# Patient Record
Sex: MALE | Race: AMERICAN INDIAN OR ALASKA NATIVE
[De-identification: names, ages, dates, MRNs, and addresses within clinical notes are randomized per-mention and may not be internally consistent; named-entity substitution may affect disease eponyms.]

---

## 2020-04-18 ENCOUNTER — HOSPITAL ENCOUNTER (INPATIENT)
Dept: HOSPITAL 5 - ED | Age: 60
LOS: 2 days | Discharge: HOME | DRG: 287 | End: 2020-04-20
Attending: INTERNAL MEDICINE | Admitting: INTERNAL MEDICINE
Payer: COMMERCIAL

## 2020-04-18 DIAGNOSIS — K21.9: ICD-10-CM

## 2020-04-18 DIAGNOSIS — Z79.899: ICD-10-CM

## 2020-04-18 DIAGNOSIS — F17.210: ICD-10-CM

## 2020-04-18 DIAGNOSIS — I25.10: Primary | ICD-10-CM

## 2020-04-18 DIAGNOSIS — E11.9: ICD-10-CM

## 2020-04-18 DIAGNOSIS — I10: ICD-10-CM

## 2020-04-18 DIAGNOSIS — B44.89: ICD-10-CM

## 2020-04-18 DIAGNOSIS — Z71.6: ICD-10-CM

## 2020-04-18 DIAGNOSIS — E78.1: ICD-10-CM

## 2020-04-18 DIAGNOSIS — Z72.89: ICD-10-CM

## 2020-04-18 LAB
ALBUMIN SERPL-MCNC: 3.8 G/DL (ref 3.9–5)
ALBUMIN SERPL-MCNC: 4.2 G/DL (ref 3.9–5)
ALT SERPL-CCNC: 44 UNITS/L (ref 7–56)
ALT SERPL-CCNC: 47 UNITS/L (ref 7–56)
BACTERIA #/AREA URNS HPF: (no result) /HPF
BASOPHILS # (AUTO): 0.1 K/MM3 (ref 0–0.1)
BASOPHILS # (AUTO): 0.1 K/MM3 (ref 0–0.1)
BASOPHILS NFR BLD AUTO: 1.4 % (ref 0–1.8)
BASOPHILS NFR BLD AUTO: 1.7 % (ref 0–1.8)
BENZODIAZEPINES SCREEN,URINE: (no result)
BILIRUB UR QL STRIP: (no result)
BLOOD UR QL VISUAL: (no result)
BUN SERPL-MCNC: 13 MG/DL (ref 9–20)
BUN SERPL-MCNC: 13 MG/DL (ref 9–20)
BUN/CREAT SERPL: 16 %
BUN/CREAT SERPL: 16 %
CALCIUM SERPL-MCNC: 9.1 MG/DL (ref 8.4–10.2)
CALCIUM SERPL-MCNC: 9.1 MG/DL (ref 8.4–10.2)
EOSINOPHIL # BLD AUTO: 0.1 K/MM3 (ref 0–0.4)
EOSINOPHIL # BLD AUTO: 0.1 K/MM3 (ref 0–0.4)
EOSINOPHIL NFR BLD AUTO: 1 % (ref 0–4.3)
EOSINOPHIL NFR BLD AUTO: 1.8 % (ref 0–4.3)
HCT VFR BLD CALC: 38.9 % (ref 35.5–45.6)
HCT VFR BLD CALC: 39.4 % (ref 35.5–45.6)
HEMOLYSIS INDEX: 3
HEMOLYSIS INDEX: 7
HGB BLD-MCNC: 12.8 GM/DL (ref 11.8–15.2)
HGB BLD-MCNC: 13 GM/DL (ref 11.8–15.2)
LYMPHOCYTES # BLD AUTO: 2.1 K/MM3 (ref 1.2–5.4)
LYMPHOCYTES # BLD AUTO: 2.5 K/MM3 (ref 1.2–5.4)
LYMPHOCYTES NFR BLD AUTO: 33.2 % (ref 13.4–35)
LYMPHOCYTES NFR BLD AUTO: 35.5 % (ref 13.4–35)
MCHC RBC AUTO-ENTMCNC: 33 % (ref 32–34)
MCHC RBC AUTO-ENTMCNC: 33 % (ref 32–34)
MCV RBC AUTO: 87 FL (ref 84–94)
MCV RBC AUTO: 87 FL (ref 84–94)
METHADONE SCREEN,URINE: (no result)
MONOCYTES # (AUTO): 0.6 K/MM3 (ref 0–0.8)
MONOCYTES # (AUTO): 0.6 K/MM3 (ref 0–0.8)
MONOCYTES % (AUTO): 8 % (ref 0–7.3)
MONOCYTES % (AUTO): 9.8 % (ref 0–7.3)
MUCOUS THREADS #/AREA URNS HPF: (no result) /HPF
OPIATE SCREEN,URINE: (no result)
PH UR STRIP: 5 [PH] (ref 5–7)
PLATELET # BLD: 381 K/MM3 (ref 140–440)
PLATELET # BLD: 416 K/MM3 (ref 140–440)
PROT UR STRIP-MCNC: (no result) MG/DL
RBC # BLD AUTO: 4.49 M/MM3 (ref 3.65–5.03)
RBC # BLD AUTO: 4.54 M/MM3 (ref 3.65–5.03)
RBC #/AREA URNS HPF: 1 /HPF (ref 0–6)
UROBILINOGEN UR-MCNC: < 2 MG/DL (ref ?–2)
WBC #/AREA URNS HPF: < 1 /HPF (ref 0–6)

## 2020-04-18 PROCEDURE — 82962 GLUCOSE BLOOD TEST: CPT

## 2020-04-18 PROCEDURE — 99406 BEHAV CHNG SMOKING 3-10 MIN: CPT

## 2020-04-18 PROCEDURE — 71045 X-RAY EXAM CHEST 1 VIEW: CPT

## 2020-04-18 PROCEDURE — C1894 INTRO/SHEATH, NON-LASER: HCPCS

## 2020-04-18 PROCEDURE — 93017 CV STRESS TEST TRACING ONLY: CPT

## 2020-04-18 PROCEDURE — 36415 COLL VENOUS BLD VENIPUNCTURE: CPT

## 2020-04-18 PROCEDURE — 83036 HEMOGLOBIN GLYCOSYLATED A1C: CPT

## 2020-04-18 PROCEDURE — A9502 TC99M TETROFOSMIN: HCPCS

## 2020-04-18 PROCEDURE — 80307 DRUG TEST PRSMV CHEM ANLYZR: CPT

## 2020-04-18 PROCEDURE — 85730 THROMBOPLASTIN TIME PARTIAL: CPT

## 2020-04-18 PROCEDURE — 80061 LIPID PANEL: CPT

## 2020-04-18 PROCEDURE — 78452 HT MUSCLE IMAGE SPECT MULT: CPT

## 2020-04-18 PROCEDURE — 85610 PROTHROMBIN TIME: CPT

## 2020-04-18 PROCEDURE — 93010 ELECTROCARDIOGRAM REPORT: CPT

## 2020-04-18 PROCEDURE — 93306 TTE W/DOPPLER COMPLETE: CPT

## 2020-04-18 PROCEDURE — 80048 BASIC METABOLIC PNL TOTAL CA: CPT

## 2020-04-18 PROCEDURE — 93005 ELECTROCARDIOGRAM TRACING: CPT

## 2020-04-18 PROCEDURE — 85025 COMPLETE CBC W/AUTO DIFF WBC: CPT

## 2020-04-18 PROCEDURE — 84484 ASSAY OF TROPONIN QUANT: CPT

## 2020-04-18 PROCEDURE — 83735 ASSAY OF MAGNESIUM: CPT

## 2020-04-18 PROCEDURE — 93458 L HRT ARTERY/VENTRICLE ANGIO: CPT

## 2020-04-18 PROCEDURE — 80053 COMPREHEN METABOLIC PANEL: CPT

## 2020-04-18 PROCEDURE — 81001 URINALYSIS AUTO W/SCOPE: CPT

## 2020-04-18 PROCEDURE — C1769 GUIDE WIRE: HCPCS

## 2020-04-18 RX ADMIN — LISINOPRIL SCH MG: 5 TABLET ORAL at 12:19

## 2020-04-18 RX ADMIN — MULTIVITAMIN TABLET SCH EACH: TABLET at 12:19

## 2020-04-18 RX ADMIN — MORPHINE SULFATE PRN MG: 4 INJECTION, SOLUTION INTRAMUSCULAR; INTRAVENOUS at 18:39

## 2020-04-18 RX ADMIN — PANTOPRAZOLE SODIUM SCH MG: 40 TABLET, DELAYED RELEASE ORAL at 12:19

## 2020-04-18 RX ADMIN — MORPHINE SULFATE PRN MG: 4 INJECTION, SOLUTION INTRAMUSCULAR; INTRAVENOUS at 22:24

## 2020-04-18 RX ADMIN — MORPHINE SULFATE PRN MG: 4 INJECTION, SOLUTION INTRAMUSCULAR; INTRAVENOUS at 06:05

## 2020-04-18 RX ADMIN — Medication SCH ML: at 22:25

## 2020-04-18 RX ADMIN — METOPROLOL TARTRATE SCH MG: 50 TABLET, FILM COATED ORAL at 22:24

## 2020-04-18 RX ADMIN — Medication SCH ML: at 12:20

## 2020-04-18 RX ADMIN — MORPHINE SULFATE PRN MG: 4 INJECTION, SOLUTION INTRAMUSCULAR; INTRAVENOUS at 16:32

## 2020-04-18 RX ADMIN — MORPHINE SULFATE PRN MG: 4 INJECTION, SOLUTION INTRAMUSCULAR; INTRAVENOUS at 12:19

## 2020-04-18 NOTE — EMERGENCY DEPARTMENT REPORT
ED Chest Pain HPI





- General


Chief Complaint: Dyspnea/Respdistress


Stated Complaint: CHEST PAIN


Time Seen by Provider: 04/18/20 03:34


Source: patient, EMS


Mode of arrival: Ambulatory


Limitations: No Limitations





- History of Present Illness


Initial Comments: 





Patient is a 59-year-old male that presents emergency room with complaints of 

chest pain and shortness of breath that started at 2 AM.  Patient states his 

chest.  Is better with rest and worse with movement and exertion.  Patient 

states his shortness of breath is better with rest and worse with exertion.  

Patient states that his chest pain is better with rest.  Patient states his 

shortness of breath is better with rest.  Patient states he smokes.  Patient 

states he drank 2 beers today.  He states he has a past medical history of 

hypertension and diabetes.  Patient states the chest pain is radiating to his 

back.


MD Complaint: chest pain


-: Sudden


Onset: during rest


Pain Location: left chest


Pain Radiation: back


Severity: severe


Severity scale (0 -10): 8


Quality: sharp


Consistency: constant


Improves With: rest


Worsens With: exertion, movement


re: dyspnea.  denies: nausea, vomting, diaphoresis, sense of impending doom


Other Symptoms: denies: cough, fever, syncope, rash, acid taste in mouth, leg 

swelling, palpitations, burping


Treatments Prior to Arrival: none


Aspirin use within the Past 7 Days: (1) Yes





- Related Data


On Oral Contraceptives: No


                                Home Medications











 Medication  Instructions  Recorded  Confirmed  Last Taken


 


lisinopriL [Zestril TAB] 5 mg PO QDAY 08/23/19 04/18/20 08/22/19








                                  Previous Rx's











 Medication  Instructions  Recorded  Last Taken  Type


 


Esomeprazole Magnesium [Nexium 20 mg PO DAILY #30 tab 12/21/19 Unknown Rx





24Hr]    


 


Metoclopramide [Reglan TAB] 10 mg PO QID PRN #30 tab 12/21/19 Unknown Rx


 


Multivitamin with Folic Acid [[facility] 400 mcg PO QDAY #30 tablet 12/21/19 Unknown Rx





One Daily Essential Tablet]    


 


chlordiazePOXIDE [Librium] 25 mg PO Q6H PRN #25 capsule 12/21/19 Unknown Rx


 


Cyclobenzaprine [Flexeril] 10 mg PO TID PRN #30 tablet 01/09/20 Unknown Rx


 


HYDROcodone/APAP 5-325 [[facility] 1 each PO Q6HR PRN #10 tablet 03/04/20 Unknown Rx





5/325]    











                                    Allergies











Allergy/AdvReac Type Severity Reaction Status Date / Time


 


No Known Allergies Allergy   Verified 12/20/19 15:20














Heart Score





- HEART Score


History: Slightly suspicious


EKG: Non-specific


Age: 45-65


Risk factors: 1-2 risk factors


Troponin: < normal limit


HEART Score: 3





ED Review of Systems


ROS: 


Stated complaint: CHEST PAIN


Other details as noted in HPI





Constitutional: denies: chills, fever


Eyes: denies: eye pain, eye discharge, vision change


ENT: denies: ear pain, throat pain


Respiratory: shortness of breath.  denies: cough, wheezing


Cardiovascular: chest pain.  denies: palpitations


Endocrine: no symptoms reported


Gastrointestinal: denies: abdominal pain, nausea, diarrhea


Genitourinary: denies: urgency, dysuria


Musculoskeletal: denies: back pain, joint swelling, arthralgia


Skin: denies: rash, lesions


Neurological: denies: headache, weakness, paresthesias


Psychiatric: denies: anxiety, depression


Hematological/Lymphatic: denies: easy bleeding, easy bruising





ED Past Medical Hx





- Past Medical History


Previous Medical History?: Yes


Hx Hypertension: Yes


Hx Heart Attack/AMI: No


Hx Congestive Heart Failure: No


Hx Diabetes: Yes


Hx Deep Vein Thrombosis: No


Hx GERD: Yes


Hx Liver Disease: No


Hx Asthma: No


Hx COPD: No


Additional medical history: ETOH





- Surgical History


Past Surgical History?: Yes


Hx Coronary Stent: No


Hx Pacemaker: No


Hx Internal Defibrillator: No


Additional Surgical History: right arm surgery- to repair artery





- Family History


Family history: no significant





- Social History


Smoking Status: Current Every Day Smoker


Substance Use Type: Alcohol





- Medications


Home Medications: 


                                Home Medications











 Medication  Instructions  Recorded  Confirmed  Last Taken  Type


 


lisinopriL [Zestril TAB] 5 mg PO QDAY 08/23/19 04/18/20 08/22/19 History


 


Esomeprazole Magnesium [Nexium 20 mg PO DAILY #30 tab 12/21/19 04/18/20 Unknown 

Rx





24Hr]     


 


Metoclopramide [Reglan TAB] 10 mg PO QID PRN #30 tab 12/21/19 04/18/20 Unknown 

Rx


 


Multivitamin with Folic Acid [[facility] 400 mcg PO QDAY #30 tablet 12/21/19 04/18/20 

Unknown Rx





One Daily Essential Tablet]     


 


chlordiazePOXIDE [Librium] 25 mg PO Q6H PRN #25 capsule 12/21/19 04/18/20 Unknow

n Rx


 


Cyclobenzaprine [Flexeril] 10 mg PO TID PRN #30 tablet 01/09/20 04/18/20 Unknown

 Rx


 


HYDROcodone/APAP 5-325 [[facility] 1 each PO Q6HR PRN #10 tablet 03/04/20 04/18/20 

Unknown Rx





5/325]     














ED Physical Exam





- General


Limitations: No Limitations


General appearance: alert, in no apparent distress





- Head


Head exam: Present: atraumatic, normocephalic





- Eye


Eye exam: Present: normal appearance





- ENT


ENT exam: Present: mucous membranes moist





- Neck


Neck exam: Present: normal inspection





- Respiratory


Respiratory exam: Present: normal lung sounds bilaterally.  Absent: respiratory 

distress





- Cardiovascular


Cardiovascular Exam: Present: regular rate, normal rhythm.  Absent: systolic 

murmur, diastolic murmur, rubs, gallop





- GI/Abdominal


GI/Abdominal exam: Present: soft, normal bowel sounds





- Rectal


Rectal exam: Present: deferred





- Extremities Exam


Extremities exam: Present: normal inspection





- Back Exam


Back exam: Present: normal inspection





- Neurological Exam


Neurological exam: Present: alert, oriented X3





- Psychiatric


Psychiatric exam: Present: normal affect, normal mood





- Skin


Skin exam: Present: warm, dry, intact, normal color.  Absent: rash





ED Course


                                   Vital Signs











  04/18/20 04/18/20 04/18/20





  03:30 03:54 04:15


 


Temperature 98.8 F  


 


Pulse Rate 98 H  90


 


Respiratory 22 19 18





Rate   


 


Blood Pressure   138/79


 


Blood Pressure 152/86  





[Left]   


 


O2 Sat by Pulse 100  94





Oximetry   














  04/18/20 04/18/20 04/18/20





  04:30 04:45 05:00


 


Temperature   


 


Pulse Rate 92 H 92 H 90


 


Respiratory 19 17 19





Rate   


 


Blood Pressure 124/74 118/71 118/69


 


Blood Pressure   





[Left]   


 


O2 Sat by Pulse 93 94 95





Oximetry   














  04/18/20 04/18/20 04/18/20





  05:15 05:30 06:00


 


Temperature   


 


Pulse Rate 92 H 94 H 99 H


 


Respiratory 18 26 H 17





Rate   


 


Blood Pressure 113/66 125/73 130/90


 


Blood Pressure   





[Left]   


 


O2 Sat by Pulse 95 95 99





Oximetry   














  04/18/20 04/18/20





  06:05 06:15


 


Temperature  


 


Pulse Rate  87


 


Respiratory 19 28 H





Rate  


 


Blood Pressure  120/76


 


Blood Pressure  





[Left]  


 


O2 Sat by Pulse  97





Oximetry  














- Reevaluation(s)


Reevaluation #1: 


I discussed all results with patient.  I discussed plan of care with patient.  

Patient agrees with plan of care and admission.  Patient to be admitted to the 

hospitalist service.


04/18/20 04:48








- Consultations


Consultation #1: 


Hospitalist consulted for admission.  Hospitalist to admit patient.  Bridge 

orders placed.


04/18/20 04:48








ERUM score





- Erum Score


Age > 65: (1) Yes


Aspirin use within the Past 7 Days: (1) Yes


3 or more CAD Risk Factors: (0) No


2 or more Angina events in past 24 hrs: (0) No


Known CAD with more than 50% Stenosis: (0) No


Elevated Cardiac Markers: (0) No


ST Deviation Greater than 0.5mm: (0) No


ERUM Score: 2





ED Medical Decision Making





- Lab Data


Result diagrams: 


                                 04/18/20 03:51





                                 04/18/20 03:51





- EKG Data


-: EKG Interpreted by Me


EKG shows normal: sinus rhythm, axis, intervals, QRS complexes, ST-T waves


Rate: normal





- Radiology Data


Radiology results: report reviewed, image reviewed








 CHEST 1 VIEW 





INDICATION / CLINICAL INFORMATION: 


Dyspnea. 





COMPARISON: 


Prior chest radiograph 12/20/2019 and prior chest CT, 8/1/2019 





FINDINGS: 





SUPPORT DEVICES: None. 





HEART / MEDIASTINUM: No significant abnormality. 





LUNGS / PLEURA: There continues to be pleural-parenchymal disease within the 

left upper lobe. The 


 appearance is very suggestive for a cavitary lesion containing soft tissue 

mass. Review of chest CT 


 on 8/1/2019 is suggestive of aspergilloma. The appearance is unchanged. The 

remainder of both lungs 


 are clear.. 





ADDITIONAL FINDINGS: No significant additional findings. 





IMPRESSION: 


1. Suspect aspergilloma within the left lung apex. This finding is unchanged 

from prior exam. 


2. No new findings. 








- Medical Decision Making





Patient is a 59-year-old male that presents emergency room with complaints of 

chest pain.  Patient chest pain rating to the back.  Patient heart score is 

above 2 and patient admitted to the hospital service.  Patient also complained 

of shortness of breath and dyspnea on exertion.  Patient had a chest x-ray and 

it shows a right upper lobe aspergillosis.  Patient require a rule out ACS by 

the hospitalist service.  Patient's EKG is unremarkable.  Patient's labs 

essentially unremarkable.











- Differential Diagnosis


Chest pain, shortness of breath, ACS.


Critical Care Time: Yes


Critical care time in (mins) excluding proc time.: 35


Critical care attestation.: 


If time is entered above; I have spent that time in minutes in the direct care 

of this critically ill patient, excluding procedure time.





Critical Care Time: 





35 minutes





ED Disposition


Clinical Impression: 


 SOB (shortness of breath), LOUISE (dyspnea on exertion), Abnormal chest x-ray, Joaquim

g mass, Pulmonary aspergilloma





Chest pain


Qualifiers:


 Chest pain type: unspecified Qualified Code(s): R07.9 - Chest pain, unspecified





Disposition: DC-09 OP ADMIT IP TO THIS HOSP


Is pt being admited?: Yes


Does the pt Need Aspirin: No


Condition: Critical


Time of Disposition: 05:53

## 2020-04-18 NOTE — CONSULTATION
CARDIOLOGY CONSULTATION



HISTORY OF PRESENT ILLNESS:  The patient is a 59-year-old -American

gentleman with history of shortness of breath of many days' duration, mostly

with exertion.  Presently having shortness of breath at rest, on and off along

with some vague anterior chest discomfort.  This pain is better with rest and

worse with exertion.  Denies any fever, cough, or chills.  The patient is

diabetic for last many years, on medications along with history of essential

hypertension and chronic smoking, smokes 1 pack per day since age 14 or so.  The

patient was admitted to the hospital and subsequently cardiac enzymes were found

to be unremarkable and underwent pharmacological stress testing today, which

showed evidence of inferolateral ischemia along with depressed ejection fraction

of 43%.



The patient denies any history of previous cardiac problems.  No history of

myocardial infarction or congestive heart failure or irregular heartbeat.



ALLERGIES:  None known.



MEDICATIONS:  At home included lisinopril 5 mg once a day, Flexeril 10 mg t.i.d.

p.r.n. in addition to Norco 5/325 in addition to Librium.



SOCIAL HISTORY:  Smokes 1 pack per day for last many years.  He drinks 6-pack of

beer on the weekend.  Denies any drug use.



REVIEW OF SYSTEMS:  History of hypertension as mentioned above for long time in

addition to diabetes.  He says he has some epigastric discomfort once in a

while, but not on a regular basis.  Denies any history of peptic ulcer disease. 

No history of strokes or seizures.  No history of pulmonary emboli or DVT.  No

history of fever or coughing.  No change in the bowel habits.  No urinary

symptoms.  No hematuria.  No history of anemia.  No history of unusual headaches

or paresthesia.  No nausea or vomiting.



PHYSICAL EXAMINATION:

GENERAL:  The patient appears to be comfortable, well-developed, well-nourished,

in no acute distress.

HEENT:  Unremarkable.  Conjunctivae pink.  Sclerae anicteric.

NECK:  Supple, no JVD.

HEART:  Regular, probably S4, no S3, no significant murmurs.

LUNGS:  Clear.

ABDOMEN:  Benign.

EXTREMITIES:  Without edema.

NEUROLOGIC:  Alert, oriented x 3.



LABORATORY DATA:  Showed WBC count of 6.9 with hemoglobin of 13 g/dL, platelet

count of 416,000.  BUN is 13, creatinine of 0.8 with glucose of 165.  Lipid

profile is not available.  The patient was noted to have abnormal CT scan in the

past.  CT of the chest done on 08/01/2019 was suggestive of aspergilloma, this

in the left upper lobe, and this is being followed at [facility] according

to the patient.



FINAL IMPRESSION:

1.  Chest pain and shortness of breath with exertion suggestive of angina along

with multiple coronary risk factors including chronic smoking, diabetes mellitus

and hypertension.  Considering the above with abnormal pharmacological stress

testing would start him on medication including aspirin once a day in addition

to beta blocker and statin.  However, considering his symptomatology, we will

proceed with cardiac catheterization for definitive diagnosis and treatment. 

Discussed with the patient and he is willing to have the same thing performed. 

We will arrange for the same.

2.  Longstanding essential hypertension.

2.  Diabetes mellitus, adult onset.

3.  Chronic smoking, still smokes 1 pack per day.

4.  ETOH abuse.

5.  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

lobe being followed at [facility].



At this time, discussed with the patient about harmful effects of smoking and

importance of quitting smoking.  He understands, and he is willing to proceed

with cardiac catheterization for definitive diagnosis and treatment.



Thank you very much, Dr. Navarro for letting us participate in outpatient care.





DD: 04/18/2020 12:50

DT: 04/18/2020 14:19

JOB# [id number]  [id number]

DRK/TONY

## 2020-04-18 NOTE — HISTORY AND PHYSICAL REPORT
History of Present Illness


Date of examination: 04/18/20


Date of admission: 


April 18, 2020


Chief complaint: 


Chest pain since 2 AM





History of present illness: 


59-year-old male with history of hypertension and GERD comes in for chest pain 

since 2 AM.  Chest pain is retrosternal and nonradiating.  No exacerbating or 

relieving factors.  No diaphoresis or palpitations.  Patient is a smoker about 

half a pack a day.  No exacerbating or relieving factors.  Has some shortness of

breath at rest and worse with exertion.  No previous coronary stents or stress 

test.








Past Medical History


Previous Medical History?: Yes


Hypertension: Yes


Diabetes: Yes


GERD: Yes


Additional medical history: ETOH





Surgical History


Past Surgical History?: Yes


Additional Surgical History: right arm surgery- to repair artery





Family History


Family history: no significant





Social History


Smoking Status: Current Every Day Smoker


Substance Use Type: Alcohol





- Medications


Home Medications: 


                                Home Medications











 Medication  Instructions  Recorded  Confirmed  Last Taken  Type


 


lisinopriL [Zestril TAB] 5 mg PO QDAY 08/23/19 08/23/19 08/22/19 History


 


Amoxicillin/Potassium Clav 1 each PO BID #20 tablet 10/22/19  Unknown Rx





[Augmentin 875-125 Tablet]     


 


Esomeprazole Magnesium [Nexium 20 mg PO DAILY #30 tab 12/21/19  Unknown Rx





24Hr]     


 


Metoclopramide [Reglan TAB] 10 mg PO QID PRN #30 tab 12/21/19  Unknown Rx


 


Multivitamin with Folic Acid [[facility] 400 mcg PO QDAY #30 tablet 12/21/19  Unknown 

Rx





One Daily Essential Tablet]     


 


chlordiazePOXIDE [Librium] 25 mg PO Q6H PRN #25 capsule 12/21/19  Unknown Rx


 


Cyclobenzaprine [Flexeril] 10 mg PO TID PRN #30 tablet 01/09/20  Unknown Rx


 


Menthol/Camphor [Tiger Balm 1 applicatio TP QID PRN #1 tube 01/09/20  Unknown Rx





Ointment]     


 


Naproxen 500 mg PO BID PRN #30 tablet 01/09/20  Unknown Rx


 


predniSONE [Deltasone] 40 mg PO QDAY 5 Days #10 tab 01/09/20  Unknown Rx


 


Cyclobenzaprine [Flexeril] 10 mg PO TID PRN #20 tablet 03/04/20  Unknown Rx


 


HYDROcodone/APAP 5-325 [[place] 1 each PO Q6HR PRN #10 tablet 03/04/20  Unknown Rx





5/325]     


 


Ibuprofen [Motrin 400 MG tab] 400 mg PO TID 5 Days #15 tablet 03/04/20  Unknown 

Rx














 Review of Systems 


ROS: 


Stated complaint: CHEST PAIN


Other details as noted in HPI





Constitutional: denies: chills, fever


Eyes: denies: eye pain, eye discharge, vision change


ENT: denies: ear pain, throat pain


Respiratory: shortness of breath.  denies: cough, wheezing


Cardiovascular: chest pain.  denies: palpitations


Endocrine: no symptoms reported


Gastrointestinal: denies: abdominal pain, nausea, diarrhea


Genitourinary: denies: urgency, dysuria


Musculoskeletal: denies: back pain, joint swelling, arthralgia


Skin: denies: rash, lesions


Neurological: denies: headache, weakness, paresthesias


Psychiatric: denies: anxiety, depression


Hematological/Lymphatic: denies: easy bleeding, easy bruising














Medications and Allergies


                                    Allergies











Allergy/AdvReac Type Severity Reaction Status Date / Time


 


No Known Allergies Allergy   Verified 12/20/19 15:20











                                Home Medications











 Medication  Instructions  Recorded  Confirmed  Last Taken  Type


 


lisinopriL [Zestril TAB] 5 mg PO QDAY 08/23/19 04/18/20 08/22/19 History


 


Esomeprazole Magnesium [Nexium 20 mg PO DAILY #30 tab 12/21/19 04/18/20 Unknown 

Rx





24Hr]     


 


Metoclopramide [Reglan TAB] 10 mg PO QID PRN #30 tab 12/21/19 04/18/20 Unknown 

Rx


 


Multivitamin with Folic Acid [[facility] 400 mcg PO QDAY #30 tablet 12/21/19 04/18/20 

Unknown Rx





One Daily Essential Tablet]     


 


chlordiazePOXIDE [Librium] 25 mg PO Q6H PRN #25 capsule 12/21/19 04/18/20 

Unknown Rx


 


Cyclobenzaprine [Flexeril] 10 mg PO TID PRN #30 tablet 01/09/20 04/18/20 Unknown

 Rx


 


HYDROcodone/APAP 5-325 [[place] 1 each PO Q6HR PRN #10 tablet 03/04/20 04/18/20 

Unknown Rx





5/325]     











Active Meds: 


Active Medications





Morphine Sulfate (Morphine)  2 mg IV Q3H PRN


   PRN Reason: Pain , Severe (7-10)


   Last Admin: 04/18/20 06:05 Dose:  2 mg


   Documented by: 











Exam





- Constitutional


Vitals: 


                                        











Temp Pulse Resp BP Pulse Ox


 


 98.8 F   87   28 H  120/76   97 


 


 04/18/20 03:30  04/18/20 06:15  04/18/20 06:15  04/18/20 06:15  04/18/20 06:15











General appearance: Present: no acute distress, well-nourished





- EENT


Eyes: Present: PERRL


ENT: hearing intact, clear oral mucosa





- Neck


Neck: Present: supple, normal ROM





- Respiratory


Respiratory effort: normal


Respiratory: bilateral: CTA





- Cardiovascular


Heart rate: 78


Rhythm: regular


Heart Sounds: Present: S1 & S2.  Absent: rub, click





- Extremities


Extremities: no ischemia, pulses intact, pulses symmetrical, No edema


Peripheral Pulses: within normal limits





- Abdominal


General gastrointestinal: Present: soft, non-tender, non-distended, normal bowel

sounds


Male genitourinary: Present: normal





- Rectal


Rectal Exam: deferred





- Integumentary


Integumentary: Present: clear, warm, dry





- Musculoskeletal


Musculoskeletal: gait normal, strength equal bilaterally





- Psychiatric


Psychiatric: appropriate mood/affect, intact judgment & insight





- Neurologic


Neurologic: CNII-XII intact, moves all extremities





ERUM score





- Erum Score


Age > 65: (1) Yes


Aspirin use within the Past 7 Days: (1) Yes


3 or more CAD Risk Factors: (0) No


2 or more Angina events in past 24 hrs: (0) No


Known CAD with more than 50% Stenosis: (0) No


Elevated Cardiac Markers: (0) No


ST Deviation Greater than 0.5mm: (0) No


ERUM Score: 2





Results





- Labs


CBC & Chem 7: 


                                 04/18/20 03:51





                                 04/18/20 03:51


Labs: 


                             Laboratory Last Values











WBC  6.9 K/mm3 (4.5-11.0)   04/18/20  03:51    


 


RBC  4.54 M/mm3 (3.65-5.03)   04/18/20  03:51    


 


Hgb  13.0 gm/dl (11.8-15.2)   04/18/20  03:51    


 


Hct  39.4 % (35.5-45.6)   04/18/20  03:51    


 


MCV  87 fl (84-94)   04/18/20  03:51    


 


MCH  29 pg (28-32)   04/18/20  03:51    


 


MCHC  33 % (32-34)   04/18/20  03:51    


 


RDW  15.2 % (13.2-15.2)   04/18/20  03:51    


 


Plt Count  416 K/mm3 (140-440)   04/18/20  03:51    


 


Lymph % (Auto)  35.5 % (13.4-35.0)  H  04/18/20  03:51    


 


Mono % (Auto)  8.0 % (0.0-7.3)  H  04/18/20  03:51    


 


Eos % (Auto)  1.0 % (0.0-4.3)   04/18/20  03:51    


 


Baso % (Auto)  1.4 % (0.0-1.8)   04/18/20  03:51    


 


Lymph #  2.5 K/mm3 (1.2-5.4)   04/18/20  03:51    


 


Mono #  0.6 K/mm3 (0.0-0.8)   04/18/20  03:51    


 


Eos #  0.1 K/mm3 (0.0-0.4)   04/18/20  03:51    


 


Baso #  0.1 K/mm3 (0.0-0.1)   04/18/20  03:51    


 


Seg Neutrophils %  54.1 % (40.0-70.0)   04/18/20  03:51    


 


Seg Neutrophils #  3.7 K/mm3 (1.8-7.7)   04/18/20  03:51    


 


Sodium  140 mmol/L (137-145)   04/18/20  03:51    


 


Potassium  4.1 mmol/L (3.6-5.0)   04/18/20  03:51    


 


Chloride  102.2 mmol/L ()   04/18/20  03:51    


 


Carbon Dioxide  24 mmol/L (22-30)   04/18/20  03:51    


 


Anion Gap  18 mmol/L  04/18/20  03:51    


 


BUN  13 mg/dL (9-20)   04/18/20  03:51    


 


Creatinine  0.8 mg/dL (0.8-1.5)   04/18/20  03:51    


 


Estimated GFR  > 60 ml/min  04/18/20  03:51    


 


BUN/Creatinine Ratio  16 %  04/18/20  03:51    


 


Glucose  165 mg/dL ()  H  04/18/20  03:51    


 


Calcium  9.1 mg/dL (8.4-10.2)   04/18/20  03:51    


 


Magnesium  2.20 mg/dL (1.7-2.3)   04/18/20  03:51    


 


Total Bilirubin  0.30 mg/dL (0.1-1.2)   04/18/20  03:51    


 


AST  42 units/L (5-40)  H  04/18/20  03:51    


 


ALT  47 units/L (7-56)   04/18/20  03:51    


 


Alkaline Phosphatase  111 units/L ()   04/18/20  03:51    


 


Troponin T  < 0.010 ng/mL (0.00-0.029)   04/18/20  03:51    


 


Total Protein  7.8 g/dL (6.3-8.2)   04/18/20  03:51    


 


Albumin  4.2 g/dL (3.9-5)   04/18/20  03:51    


 


Albumin/Globulin Ratio  1.2 %  04/18/20  03:51    


 


Urine Color  Straw  (Yellow)   04/18/20  03:53    


 


Urine Turbidity  Clear  (Clear)   04/18/20  03:53    


 


Urine pH  5.0  (5.0-7.0)   04/18/20  03:53    


 


Ur Specific Gravity  1.006  (1.003-1.030)   04/18/20  03:53    


 


Urine Protein  <15 mg/dl mg/dL (Negative)   04/18/20  03:53    


 


Urine Glucose (UA)  Neg mg/dL (Negative)   04/18/20  03:53    


 


Urine Ketones  Neg mg/dL (Negative)   04/18/20  03:53    


 


Urine Blood  Neg  (Negative)   04/18/20  03:53    


 


Urine Nitrite  Neg  (Negative)   04/18/20  03:53    


 


Urine Bilirubin  Neg  (Negative)   04/18/20  03:53    


 


Urine Urobilinogen  < 2.0 mg/dL (<2.0)   04/18/20  03:53    


 


Ur Leukocyte Esterase  Neg  (Negative)   04/18/20  03:53    


 


Urine WBC (Auto)  < 1.0 /HPF (0.0-6.0)   04/18/20  03:53    


 


Urine RBC (Auto)  1.0 /HPF (0.0-6.0)   04/18/20  03:53    


 


Urine Bacteria (Auto)  1+ /HPF (Negative)   04/18/20  03:53    


 


Urine Mucus  Few /HPF  04/18/20  03:53    


 


Urine Opiates Screen  Presumptive negative   04/18/20  03:53    


 


Urine Methadone Screen  Presumptive negative   04/18/20  03:53    


 


Ur Barbiturates Screen  Presumptive negative   04/18/20  03:53    


 


Ur Phencyclidine Scrn  Presumptive negative   04/18/20  03:53    


 


Ur Amphetamines Screen  Presumptive negative   04/18/20  03:53    


 


U Benzodiazepines Scrn  Presumptive negative   04/18/20  03:53    


 


Urine Cocaine Screen  Presumptive negative   04/18/20  03:53    


 


U Marijuana (THC) Screen  Presumptive negative   04/18/20  03:53    


 


Drugs of Abuse Note  Disclamer   04/18/20  03:53    








                                    Short CBC











  04/18/20 Range/Units





  03:51 


 


WBC  6.9  (4.5-11.0)  K/mm3


 


Hgb  13.0  (11.8-15.2)  gm/dl


 


Hct  39.4  (35.5-45.6)  %


 


Plt Count  416  (140-440)  K/mm3








                                       BMP











  04/18/20





  03:51


 


Sodium  140


 


Potassium  4.1


 


Chloride  102.2


 


Carbon Dioxide  24


 


BUN  13


 


Creatinine  0.8


 


Glucose  165 H


 


Calcium  9.1








                                 Cardiac Enzymes











  04/18/20 Range/Units





  03:51 


 


Troponin T  < 0.010  (0.00-0.029)  ng/mL








                                 Liver Function











  04/18/20 Range/Units





  03:51 


 


Total Bilirubin  0.30  (0.1-1.2)  mg/dL


 


AST  42 H  (5-40)  units/L


 


ALT  47  (7-56)  units/L


 


Alkaline Phosphatase  111  ()  units/L


 


Albumin  4.2  (3.9-5)  g/dL








                                      Urine











  04/18/20 Range/Units





  03:53 


 


Urine Color  Straw  (Yellow)  


 


Urine pH  5.0  (5.0-7.0)  


 


Ur Specific Gravity  1.006  (1.003-1.030)  


 


Urine Protein  <15 mg/dl  (Negative)  mg/dL


 


Urine Glucose (UA)  Neg  (Negative)  mg/dL














- Imaging and Cardiology


EKG: report reviewed (Sinus rhythm)


Imaging and Cardiology: 


Chest x-ray





LUNGS / PLEURA: There continues to be pleural-parenchymal disease within the 

left upper lobe. The appearance is very suggestive for a cavitary lesion 

containing soft tissue mass. Review of chest CT on 8/1/2019 is suggestive of 

aspergilloma. The appearance is unchanged. The remainder of both lungs are 

clear.. ADDITIONAL FINDINGS: No significant additional findings. IMPRESSION: 1. 

Suspect aspergilloma within the left lung apex. This finding is unchanged from 

prior exam. 2. No new findings.





Booker/IV: 


                                        





IV Catheter Type [Right          INT / Saline Lock


Forearm]                         











Assessment and Plan


Advance Directives: Yes (Full code)


VTE prophylaxis?: Chemical


Plan of care discussed with patient/family: Yes





- Patient Problems


(1) Chest pain


Current Visit: Yes   Status: Acute   


Qualifiers: 


   Chest pain type: unspecified   Qualified Code(s): R07.9 - Chest pain, 

unspecified   


Plan to address problem: 


Chest pain work-up


Patient to get troponins and Lexiscan this morning








(2) Hypertension


Current Visit: Yes   Status: Chronic   


Qualifiers: 


   Hypertension type: essential hypertension   Qualified Code(s): I10 - Essent

ial (primary) hypertension   


Plan to address problem: 


Continue antihypertensives








(3) Type 2 diabetes mellitus


Current Visit: Yes   Status: Chronic   


Qualifiers: 


   Diabetes mellitus long term insulin use: unspecified long term insulin use 

status 


Plan to address problem: 


Continue coverage and check hemoglobin A1c








(4) H/O aspergilloma


Current Visit: Yes   Status: Chronic   


Plan to address problem: 


Is unchanged left apex opacity from 2019


Will defer to primary team


Patient may not need any further intervention








(5) Nicotine dependence


Current Visit: Yes   Status: Chronic   


Qualifiers: 


   Nicotine product type: cigarettes 


Plan to address problem: 


Patient counseled and started on NicoDerm patch








(6) DVT prophylaxis


Current Visit: Yes   Status: Acute   


Plan to address problem: 


On heparin and GI prophylaxis

## 2020-04-18 NOTE — XRAY REPORT
CHEST 1 VIEW 



INDICATION / CLINICAL INFORMATION:

Dyspnea.



COMPARISON: 

Prior chest radiograph 12/20/2019 and prior chest CT, 8/1/2019



FINDINGS:



SUPPORT DEVICES: None.



HEART / MEDIASTINUM: No significant abnormality. 



LUNGS / PLEURA: There continues to be pleural-parenchymal disease within the left upper lobe. The fabiana
earance is very suggestive for a cavitary lesion containing soft tissue mass. Review of chest CT on 8
/1/2019 is suggestive of aspergilloma. The appearance is unchanged. The remainder of both lungs are c
lear.. 



ADDITIONAL FINDINGS: No significant additional findings.



IMPRESSION:

1. Suspect aspergilloma within the left lung apex. This finding is unchanged from prior exam.

2. No new findings.



Signer Name: Mandy Romero MD 

Signed: 4/18/2020 4:00 AM

Workstation Name: [id number]

## 2020-04-18 NOTE — EVENT NOTE
Date: 04/18/20





admitted for chest pain


cardiology consulted - pending recommendation, trop negative


cont to follow

## 2020-04-19 LAB — HDLC SERPL-MCNC: 53 MG/DL (ref 40–59)

## 2020-04-19 RX ADMIN — METOPROLOL TARTRATE SCH MG: 50 TABLET, FILM COATED ORAL at 21:05

## 2020-04-19 RX ADMIN — PANTOPRAZOLE SODIUM SCH MG: 40 TABLET, DELAYED RELEASE ORAL at 10:57

## 2020-04-19 RX ADMIN — LISINOPRIL SCH MG: 5 TABLET ORAL at 10:57

## 2020-04-19 RX ADMIN — MORPHINE SULFATE PRN MG: 4 INJECTION, SOLUTION INTRAMUSCULAR; INTRAVENOUS at 19:44

## 2020-04-19 RX ADMIN — METOPROLOL TARTRATE SCH MG: 50 TABLET, FILM COATED ORAL at 10:57

## 2020-04-19 RX ADMIN — Medication SCH ML: at 21:05

## 2020-04-19 RX ADMIN — MULTIVITAMIN TABLET SCH EACH: TABLET at 10:58

## 2020-04-19 RX ADMIN — Medication SCH ML: at 10:58

## 2020-04-19 NOTE — PROGRESS NOTE
Assessment and Plan


Echo 4/18/2020 reviewed - EF 50-55%; normal LV diastolic filling.


Continue current cardiac regimen. 


Plan for LHC in AM. NPO after midnight. 





Pt seen in conjunction with Dr. Mullins, who agrees with the assessment and plan

of care.





- Patient Problems


(1) Chest pain


Current Visit: Yes   Status: Acute   


Qualifiers: 


   Chest pain type: unspecified   Qualified Code(s): R07.9 - Chest pain, 

unspecified   





(2) Hypertension


Current Visit: Yes   Status: Chronic   


Qualifiers: 


   Hypertension type: essential hypertension   Qualified Code(s): I10 - 

Essential (primary) hypertension   





(3) Hypertriglyceridemia


Current Visit: Yes   Status: Chronic   





(4) Type 2 diabetes mellitus


Current Visit: Yes   Status: Chronic   


Qualifiers: 


   Diabetes mellitus long term insulin use: unspecified long term insulin use 

status 





(5) Tobacco abuse


Current Visit: Yes   Status: Chronic   





Subjective


Date of service: 04/19/20


Principal diagnosis: Chest Pain


Interval history: 


Pt lying comfortably in bed upon exam. He reports intermittent CP, which he 

states is better this AM. Pt reports he was unable to sleep last night. Tele 

reviewed - pt currently in NSR 80s with several episodes of -130s over

night. No acute events.





Objective


Last Vital Signs











Temp  98.3 F   04/19/20 08:04


 


Pulse  86   04/19/20 10:00


 


Resp  16   04/19/20 08:04


 


BP  138/87   04/19/20 08:04


 


Pulse Ox  99   04/19/20 08:04











- Physical Examination


General: No Apparent Distress


HEENT: Positive: EOMI, Normocephaly, Mucus Membranes Moist


Neck: Positive: neck supple, trachea midline.  Negative: JVD/HJR


Cardiac: Positive: Reg Rate and Rhythm, S1/S2


Lungs: Positive: clear to auscultation (bilaterally), No Wheeze, Rales, Rhonchi


Neuro: Positive: Grossly Intact, Motor Function Intact, Coordination Normal, 

Sensory Function Intact.  Negative: Numbness, Weakness


Abdomen: Positive: Soft, Active Bowel Sounds.  Negative: Tender


Skin: Negative: Rash, Suspicious Lesions


Musculoskeletal: No Pain, Normal Range of Motion


Extremities: Present: upper extr. pulses, lower extr. pulses.  Absent: edema





- Labs and Meds


                                     Lipids











  04/19/20 Range/Units





  04:15 


 


Triglycerides  198 H  (2-149)  mg/dL


 


Cholesterol  135  ()  mg/dL


 


HDL Cholesterol  53  (40-59)  mg/dL


 


Cholesterol/HDL Ratio  2.54  %














- Imaging and Cardiology


EKG: report reviewed, image reviewed


Echo: report reviewed (EF 50-55%; normal LV diastolic filling)





- Telemetry


EKG Rhythm: Sinus Rhythm





- EKG


Sinus rhythms and dysrhythmias: sinus rhythm

## 2020-04-19 NOTE — PROGRESS NOTE
Assessment and Plan





/ Chest pain


Chest pain work-up


 Lexiscan this morning showed reversible defect with EF of 40%


Plan for cardiac cath on Monday








/ Hypertension 


Continue antihypertensives








/ Type 2 diabetes mellitus


Continue coverage and check hemoglobin A1c








/ H/O aspergilloma


Is unchanged left apex opacity from 2019


Patient may not need any further intervention








/ Nicotine dependence


Patient counseled and started on NicoDerm patch








/ DVT prophylaxis


On heparin and GI prophylaxis





04/18 stress test results is abnormal showing reversible ischemia, plan for 

cardiac cath on Monday 04/19: Continue to monitor the patient clinically, cardiac cath tomorrow.  

N.p.o. after midnight





Brief history:


Patient is a 59-year-old male with a history of tobacco and alcohol abuse that 

presents emergency room with complaints of chest pain and shortness of breath 

that started at 2 AM.  He states he has a past medical history of hypertension 

and diabetes.  Patient admitted for further evaluation management, Lexiscan 

showed reversible ischemia, plan for cardiac cath on Monday.





Physical exam:


GENERAL:  well-developed -American male lying on bed appeared to be in no

discomfort. 


HEENT: Normocephalic.  Atraumatic.  No conjunctival congestion or icterus. 

Patient has moist mucous membranes.


NECK: Supple.  Trachea midline.


CHEST/LUNGS: Clear to auscultated bilaterally, breathing nonlabored. No wheezes 

crackles or rhonchi.


HEART/CARDIOVASCULAR: Regular in rate and rhythm.  S1 and S2 positive.


ABDOMEN: Abdomen is soft, nontender.  Patient has normal bowel sounds.  


SKIN: There is no rash.  Warm and dry.


NEURO:  No focal motor deficit.  Follows command.


MUSCULOSKELETAL: No joint effusion or tenderness.


EXTRIMITY: No edema, no cyanosis or clubbing.


PSYCH:  Cooperative.











Subjective


Date of service: 04/19/20


Interval history: 





Patient seen and examined.  Medical records and medication list reviewed.  


No acute event overnight noted by the RN.  


Patient complains of intermittent chest pain which gets relieved by morphine and

nitroglycerin, denies any difficulty breathing. 


Patient is tolerating diet.  


Discussed plan of care at bedside with patient.








Objective





- Constitutional


Vitals: 


                               Vital Signs - 12hr











  04/19/20 04/19/20





  04:57 08:04


 


Temperature 97.9 F 98.3 F


 


Pulse Rate 84 91 H


 


Respiratory 18 16





Rate  


 


Blood Pressure 149/93 138/87


 


O2 Sat by Pulse 100 99





Oximetry  














- Labs


CBC & Chem 7: 


                                 04/18/20 06:47





                                 04/18/20 06:47


Labs: 


                              Abnormal lab results











  04/19/20 Range/Units





  04:15 


 


Triglycerides  198 H  (2-149)  mg/dL

## 2020-04-20 VITALS — SYSTOLIC BLOOD PRESSURE: 141 MMHG | DIASTOLIC BLOOD PRESSURE: 78 MMHG

## 2020-04-20 LAB
APTT BLD: 30.1 SEC. (ref 24.2–36.6)
BUN SERPL-MCNC: 15 MG/DL (ref 9–20)
BUN/CREAT SERPL: 19 %
CALCIUM SERPL-MCNC: 9.7 MG/DL (ref 8.4–10.2)
HEMOLYSIS INDEX: 6
INR PPP: 1.08 (ref 0.87–1.13)

## 2020-04-20 PROCEDURE — 4A023N7 MEASUREMENT OF CARDIAC SAMPLING AND PRESSURE, LEFT HEART, PERCUTANEOUS APPROACH: ICD-10-PCS | Performed by: INTERNAL MEDICINE

## 2020-04-20 PROCEDURE — B2111ZZ FLUOROSCOPY OF MULTIPLE CORONARY ARTERIES USING LOW OSMOLAR CONTRAST: ICD-10-PCS | Performed by: INTERNAL MEDICINE

## 2020-04-20 PROCEDURE — B2151ZZ FLUOROSCOPY OF LEFT HEART USING LOW OSMOLAR CONTRAST: ICD-10-PCS | Performed by: INTERNAL MEDICINE

## 2020-04-20 RX ADMIN — FENTANYL CITRATE ONE MCG: 50 INJECTION, SOLUTION INTRAMUSCULAR; INTRAVENOUS at 11:13

## 2020-04-20 RX ADMIN — MIDAZOLAM ONE MG: 1 INJECTION INTRAMUSCULAR; INTRAVENOUS at 11:20

## 2020-04-20 RX ADMIN — Medication SCH ML: at 12:52

## 2020-04-20 RX ADMIN — LISINOPRIL SCH MG: 5 TABLET ORAL at 12:52

## 2020-04-20 RX ADMIN — MORPHINE SULFATE PRN MG: 4 INJECTION, SOLUTION INTRAMUSCULAR; INTRAVENOUS at 03:21

## 2020-04-20 RX ADMIN — MIDAZOLAM ONE MG: 1 INJECTION INTRAMUSCULAR; INTRAVENOUS at 11:13

## 2020-04-20 RX ADMIN — METOPROLOL TARTRATE SCH MG: 50 TABLET, FILM COATED ORAL at 12:51

## 2020-04-20 RX ADMIN — MULTIVITAMIN TABLET SCH EACH: TABLET at 12:52

## 2020-04-20 RX ADMIN — FENTANYL CITRATE ONE MCG: 50 INJECTION, SOLUTION INTRAMUSCULAR; INTRAVENOUS at 11:20

## 2020-04-20 RX ADMIN — PANTOPRAZOLE SODIUM SCH MG: 40 TABLET, DELAYED RELEASE ORAL at 12:52

## 2020-04-20 NOTE — CARDIAC CATHERIZATION REPORT
CARDIAC CATHETERIZATION REPORT



INDICATION FOR PROCEDURE:  The patient is a 59-year-old -American

gentleman with history of longstanding hypertension, diabetes mellitus, chronic

smoking and alcohol use.  He is admitted with chest pain and shortness of breath

with exertion, which gets better with rest.  Cardiac enzymes were negative. 

However, nuclear imaging showed evidence of inferolateral ischemia, hence

scheduled for cardiac catheterization for definitive diagnosis and treatment. 

The patient is aware of the procedure, potential complications and alternatives

of therapy available.



DESCRIPTION OF PROCEDURE:  The patient was brought to the catheterization

laboratory in a fasting condition.  The patient was prepared in a standard

fashion.  Sterile drapes were applied.  The patient was evaluated for moderate

sedation and was felt to be appropriate candidate for moderate sedation and

received 1 mg of Versed and 100 mg of fentanyl.  Subsequently, sterile drapes

were applied and local anesthesia was given in the right wrist area and right

radial artery puncture was made using 21-gauge arterial puncture needle. 

Subsequently, 5-Wolof slender sheath was introduced.  Using a Glidewire and

also J tipped regular wire, JR4 catheter was used to obtain the angiograms of

the right coronary artery and multipurpose catheter was used to obtain the

angiograms of the left coronary artery and left ventriculogram done in [place] and

CRISTINO projection.  At the end of the procedure, catheter and sheath were removed. 

Good hemostasis was achieved with radial band application.  The patient

tolerated the procedure well.  No untoward complications were noted.



The patient was monitored throughout the procedure for any side effects from

sedation.  The patient was monitored with EKG, hemodynamic, and pulse oximetry. 

The patient tolerated the sedation well.  At the end of the procedure, the

patient is communicating normally and breathing normally, with no focal

deficits.  The patient was transferred to the room in stable condition. 

Following findings were noted.



HEMODYNAMICS:

1.  Aortic pressure of 139/73.  Left ventricular pressure 140/12.  No gradient

across the aortic valve.  Estimated ejection fraction 50-55%.

2.  Left ventriculogram done in [place] projection shows normal sized left ventricle

with normal contractility.  End-diastolic and systolic volumes are normal.

3.  Right coronary artery dominant vessel arises normally from right coronary

cusp.  This is dominant vessel, angiographically smooth and normal.

4.  Left coronary artery arises normally from left coronary cusp.  Left main is

long, smooth and normal.  LAD showed very minimal irregularities in the mid and

distal LAD.  This is tortuous vessel.  Diagonal branch is also without any

significant disease.  Circumflex artery and its branch are angiographically

smooth and normal.



FINAL IMPRESSION:

1.  Normal sized left ventricle with normal contractility.  End-diastolic

pressure was normal measuring 12 mmHg.

2.  Essentially normal coronary anatomy with only minimal irregularities in the

left anterior descending.



At this time, the patient does not have any significant angiographic disease to

explain his exertional symptoms.  However, would continue aggressive risk factor

modification.  At this time, etiology of his shortness of breath with exertion

and chest pain is not clear.  Other causes need to be considered.  The patient

tolerated the procedure well.





DD: 04/20/2020 11:47

DT: 04/20/2020 11:57

JOB# [id number]  [id number]

GALINA/TONY

## 2020-04-20 NOTE — PROGRESS NOTE
Assessment and Plan


Echo 4/18/2020 reviewed - EF 50-55%; normal LV diastolic filling.


S/p LHC today which showed mild CAD. Cont present cardiac regimen. 


Currently stable cardiac status. Pt may discharge from cardiology standpoint. 


Recommend pt follow up in our office with Dr. Mullins within 2 weeks of 

discharge ([phone]). 





Pt seen in conjunction with Dr. Mullins, who agrees with the assessment and plan

of care.





- Patient Problems


(1) Chest pain


Current Visit: Yes   Status: Acute   


Qualifiers: 


   Chest pain type: unspecified   Qualified Code(s): R07.9 - Chest pain, 

unspecified   





(2) Hypertension


Current Visit: Yes   Status: Chronic   


Qualifiers: 


   Hypertension type: essential hypertension   Qualified Code(s): I10 - 

Essential (primary) hypertension   





(3) Hypertriglyceridemia


Current Visit: Yes   Status: Chronic   





(4) Type 2 diabetes mellitus


Current Visit: Yes   Status: Chronic   


Qualifiers: 


   Diabetes mellitus long term insulin use: unspecified long term insulin use 

status 





(5) Tobacco abuse


Current Visit: Yes   Status: Chronic   





(6) CAD


Current Visit: Yes   Status: Chronic   








Subjective


Date of service: 04/20/20


Principal diagnosis: Chest Pain


Interval history: 





pt for [facility], no current complaints. in SR on tele. 





Objective





                                Last Vital Signs











Temp  97.6 F   04/20/20 07:36


 


Pulse  78   04/20/20 07:36


 


Resp  18   04/20/20 07:36


 


BP  141/78   04/20/20 07:36


 


Pulse Ox  98   04/20/20 07:36

















- Physical Examination


General: No Apparent Distress


HEENT: Positive: EOMI, Normocephaly, Mucus Membranes Moist


Neck: Positive: neck supple, trachea midline.  Negative: JVD/HJR


Cardiac: Positive: Reg Rate and Rhythm, S1/S2


Lungs: Positive: Decreased Breath Sounds


Neuro: Positive: Grossly Intact, Motor Function Intact, Coordination Normal, 

Sensory Function Intact.  Negative: Numbness, Weakness


Abdomen: Positive: Soft, Active Bowel Sounds.  Negative: Tender


Skin: Negative: Rash, Suspicious Lesions


Musculoskeletal: No Pain, Normal Range of Motion


Extremities: Present: upper extr. pulses, lower extr. pulses.  Absent: edema





- Labs and Meds


                                   Coagulation











  04/20/20 Range/Units





  09:47 


 


PT  14.1  (12.2-14.9)  Sec.


 


INR  1.08  (0.87-1.13)  


 


APTT  30.1  (24.2-36.6)  Sec.








                          Comprehensive Metabolic Panel











  04/20/20 Range/Units





  10:20 


 


Sodium  136 L  (137-145)  mmol/L


 


Potassium  4.7  (3.6-5.0)  mmol/L


 


Chloride  98.8  ()  mmol/L


 


Carbon Dioxide  23  (22-30)  mmol/L


 


BUN  15  (9-20)  mg/dL


 


Creatinine  0.8  (0.8-1.5)  mg/dL


 


Glucose  127 H  ()  mg/dL


 


Calcium  9.7  (8.4-10.2)  mg/dL














- Imaging and Cardiology


EKG: report reviewed, image reviewed


Echo: report reviewed (EF 50-55%; normal LV diastolic filling)





- EKG


Sinus rhythms and dysrhythmias: sinus rhythm

## 2020-04-20 NOTE — DISCHARGE SUMMARY
Providers





- Providers


Date of Admission: 


04/18/20 08:32





Date of discharge: 04/20/20


Attending physician: 


ROLY MACDONALD





                                        





04/18/20 06:45


Consult to Physician [CONS] Routine 


   Comment: 


   Consulting Provider: ELEN DELGADO


   Physician Instructions: 


   Reason For Exam: Chest pain





04/20/20 12:15


Consult to Cardiac Rehabilitation [CONS] Routine 


   Reason For Exam: Cardiac Rehab Evaluation











Primary care physician: 


[facility], MD








Hospitalization


Reason for admission: Chest pain


Condition: Critical


Hospital course: 





Patient is a 59-year-old male with a history of tobacco and alcohol abuse that 

presents emergency room with complaints of chest pain and shortness of breath 

that started at 2 AM.  He states he has a past medical history of hypertension 

and diabetes.  Patient admitted for further evaluation management, Lexiscan 

showed reversible ischemia, planned for cardiac cath which showed mild coronary 

artery disease and plan to manage medically.  Patient was then discharged home 

in stable condition with outpatient follow-up.








Echo 4/18/2020 reviewed - EF 50-55%; normal LV diastolic filling.


MPI stress test showed reversible ischemia with EF of 40%


S/p LHC today which showed mild CAD. Cont present cardiac regimen. 





Discharge diagnosis and Mx:


/Chest pain, likely due to coronary artery disease


Lexiscan showed reversible defect with EF of 40%


Cardiac cath showed mild coronary artery disease and plan to manage medically 





/Hypertension, continue antihypertensive





/Diabetes mellitus type 2, managed with SSI


Continue home medications





/History of aspergilloma, chest x-ray unchanged from 2019


Continue outpatient follow-up





/Nicotine dependence, persistent counseled and recommended cessation





Physical exam:


GENERAL:  well-developed -American male lying on bed appeared to be in no

 discomfort. 


HEENT: Normocephalic.  Atraumatic.  No conjunctival congestion or icterus. 

Patient has moist mucous membranes.


NECK: Supple.  Trachea midline.


CHEST/LUNGS: Clear to auscultated bilaterally, breathing nonlabored. No wheezes 

crackles or rhonchi.


HEART/CARDIOVASCULAR: Regular in rate and rhythm.  S1 and S2 positive.


ABDOMEN: Abdomen is soft, nontender.  Patient has normal bowel sounds.  


SKIN: There is no rash.  Warm and dry.


NEURO:  No focal motor deficit.  Follows command.


MUSCULOSKELETAL: No joint effusion or tenderness.


EXTRIMITY: No edema, no cyanosis or clubbing.


PSYCH:  Cooperative.








Disposition: DC-01 TO HOME OR SELFCARE


Time spent for discharge: 34 minutes





Core Measure Documentation





- Palliative Care


Palliative Care/ Comfort Measures: Not Applicable





- Core Measures


Any of the following diagnoses?: none





Exam





- Constitutional


Vitals: 


                                        











Temp Pulse Resp BP Pulse Ox


 


 97.4 F L  87   18   141/78   100 


 


 04/20/20 12:15  04/20/20 12:15  04/20/20 12:15  04/20/20 12:52  04/20/20 12:15














Plan


Activity: advance as tolerated


Weight Bearing Status: Non-Weight Bearing


Diet: low fat, low carbohydrate


Special Instructions: record daily BP diary, record blood sugar diary


[facility] 


[facility] [[facility] Provider[facility] - 7 Days


PRABHJOT ESPARZA MD [Staff Physician] - 7 Days


Prescriptions: 


AtorvaSTATin [Lipitor] 40 mg PO QHS #30 tablet


Aspirin [Aspirin BABY CHEW TAB] 81 mg PO QDAY #30 tab.chew


Metoprolol [Lopressor TAB] 50 mg PO BID #60 tablet


Esomeprazole Magnesium [Nexium 24Hr] 20 mg PO DAILY #30 tab


lisinopriL [Zestril TAB] 5 mg PO QDAY #30

## 2020-05-15 ENCOUNTER — HOSPITAL ENCOUNTER (OUTPATIENT)
Dept: HOSPITAL 5 - ED | Age: 60
Setting detail: OBSERVATION
LOS: 1 days | Discharge: HOME | End: 2020-05-16
Attending: INTERNAL MEDICINE | Admitting: INTERNAL MEDICINE
Payer: COMMERCIAL

## 2020-05-15 DIAGNOSIS — Z79.84: ICD-10-CM

## 2020-05-15 DIAGNOSIS — R07.89: Primary | ICD-10-CM

## 2020-05-15 DIAGNOSIS — E78.1: ICD-10-CM

## 2020-05-15 DIAGNOSIS — K21.9: ICD-10-CM

## 2020-05-15 DIAGNOSIS — F10.10: ICD-10-CM

## 2020-05-15 DIAGNOSIS — E87.5: ICD-10-CM

## 2020-05-15 DIAGNOSIS — Z79.899: ICD-10-CM

## 2020-05-15 DIAGNOSIS — E78.5: ICD-10-CM

## 2020-05-15 DIAGNOSIS — E11.9: ICD-10-CM

## 2020-05-15 DIAGNOSIS — Z87.891: ICD-10-CM

## 2020-05-15 DIAGNOSIS — Z71.6: ICD-10-CM

## 2020-05-15 DIAGNOSIS — I10: ICD-10-CM

## 2020-05-15 LAB
APTT BLD: 30.9 SEC. (ref 24.2–36.6)
BASOPHILS # (AUTO): 0.1 K/MM3 (ref 0–0.1)
BASOPHILS NFR BLD AUTO: 1.7 % (ref 0–1.8)
BUN SERPL-MCNC: 15 MG/DL (ref 9–20)
BUN/CREAT SERPL: 15 %
CALCIUM SERPL-MCNC: 9.9 MG/DL (ref 8.4–10.2)
EOSINOPHIL # BLD AUTO: 0.1 K/MM3 (ref 0–0.4)
EOSINOPHIL NFR BLD AUTO: 1 % (ref 0–4.3)
HCT VFR BLD CALC: 43.3 % (ref 35.5–45.6)
HEMOLYSIS INDEX: 10
HGB BLD-MCNC: 14 GM/DL (ref 11.8–15.2)
INR PPP: 1.23 (ref 0.87–1.13)
LYMPHOCYTES # BLD AUTO: 1.9 K/MM3 (ref 1.2–5.4)
LYMPHOCYTES NFR BLD AUTO: 34.5 % (ref 13.4–35)
MCHC RBC AUTO-ENTMCNC: 32 % (ref 32–34)
MCV RBC AUTO: 87 FL (ref 84–94)
MONOCYTES # (AUTO): 0.5 K/MM3 (ref 0–0.8)
MONOCYTES % (AUTO): 9 % (ref 0–7.3)
PLATELET # BLD: 403 K/MM3 (ref 140–440)
RBC # BLD AUTO: 4.99 M/MM3 (ref 3.65–5.03)

## 2020-05-15 PROCEDURE — 84484 ASSAY OF TROPONIN QUANT: CPT

## 2020-05-15 PROCEDURE — 96374 THER/PROPH/DIAG INJ IV PUSH: CPT

## 2020-05-15 PROCEDURE — 83880 ASSAY OF NATRIURETIC PEPTIDE: CPT

## 2020-05-15 PROCEDURE — 99285 EMERGENCY DEPT VISIT HI MDM: CPT

## 2020-05-15 PROCEDURE — 85730 THROMBOPLASTIN TIME PARTIAL: CPT

## 2020-05-15 PROCEDURE — 85025 COMPLETE CBC W/AUTO DIFF WBC: CPT

## 2020-05-15 PROCEDURE — 71045 X-RAY EXAM CHEST 1 VIEW: CPT

## 2020-05-15 PROCEDURE — 84132 ASSAY OF SERUM POTASSIUM: CPT

## 2020-05-15 PROCEDURE — 36415 COLL VENOUS BLD VENIPUNCTURE: CPT

## 2020-05-15 PROCEDURE — 82962 GLUCOSE BLOOD TEST: CPT

## 2020-05-15 PROCEDURE — 96376 TX/PRO/DX INJ SAME DRUG ADON: CPT

## 2020-05-15 PROCEDURE — 82553 CREATINE MB FRACTION: CPT

## 2020-05-15 PROCEDURE — G0378 HOSPITAL OBSERVATION PER HR: HCPCS

## 2020-05-15 PROCEDURE — 85610 PROTHROMBIN TIME: CPT

## 2020-05-15 PROCEDURE — 80048 BASIC METABOLIC PNL TOTAL CA: CPT

## 2020-05-15 PROCEDURE — 99406 BEHAV CHNG SMOKING 3-10 MIN: CPT

## 2020-05-15 PROCEDURE — 93005 ELECTROCARDIOGRAM TRACING: CPT

## 2020-05-15 PROCEDURE — 96375 TX/PRO/DX INJ NEW DRUG ADDON: CPT

## 2020-05-15 PROCEDURE — 82550 ASSAY OF CK (CPK): CPT

## 2020-05-15 RX ADMIN — METOPROLOL TARTRATE SCH MG: 50 TABLET, FILM COATED ORAL at 21:48

## 2020-05-15 NOTE — XRAY REPORT
CHEST 1 VIEW 5/15/2020 9:57 AM



INDICATION / CLINICAL INFORMATION:

chest pain.



COMPARISON: 

One view of the chest from 4/18/2020.



FINDINGS:



SUPPORT DEVICES: None.

HEART / MEDIASTINUM: No significant abnormality. 

LUNGS / PLEURA: There are similar left upper lobe pleural-parenchymal opacities. Chronic appearing pa
renchymal changes are again seen along the remainder of the left lung. The right lung is clear. No pn
eumothorax. 



ADDITIONAL FINDINGS: No significant additional findings.



IMPRESSION:

Stable appearance of the chest compared to 4/18/2020.



Signer Name: Girish Sim MD 

Signed: 5/15/2020 11:02 AM

Workstation Name: [id number]

## 2020-05-15 NOTE — HISTORY AND PHYSICAL REPORT
History of Present Illness


Date of examination: 05/15/20


Date of admission: 


05/15/20 11:42





Chief complaint: 





chest pain


History of present illness: 





The patient is a 59-year-old male with h/o HTN, DM, GERD present with chief 

complaint of chest pain.  Patient states his symptoms began this morning with 

left-sided chest pain radiating to his neck.  Patient describes the pain is 

sharp and intermittent in nature.  Patient admits to shortness of breath, 

nausea/vomiting and diaphoresis with this chest pain.  The patient currently 

gives his pain a score of 10/10.  Patient was admitted last month and was 

evaluated by extensive cardiac work-up.  Today in the ER he has normal troponin,

mild T wave inversion.  He is being admitted in observation status for further 

evaluation.





Echo 4/18/2020 reviewed - EF 50-55%; normal LV diastolic filling.


MPI stress test 4/18/20 showed reversible ischemia with EF of 40%


S/p LHC 4/20/20 which showed mild CAD. Cont present cardiac regimen. 





Past Medical Hx





- Past Medical History


Hx Hypertension: Yes


Hx Diabetes: Yes


Hx GERD: Yes


Additional medical history: ETOH





- Surgical History


Additional Surgical History: right arm surgery- to repair artery





- Family History


Family history: no significant





- Social History


Smoking Status: Unknown if ever smoked


Substance Use Type: None





Review of System:


Constitutional: no fever, no chills, no weight loss


Ears, eyes, nose, mouth and throat: no nasal congestion, no nasal discharge, no 

sinus pressure, no vision change, no red eye.


Neck: No neck pain or rigidity.


Cardiovascular:+ chest pain, no orthopnea, no palpitations, no leg swelling


Respiratory: No shortness of breath, no cough, no congestion, no wheezing


Gastrointestinal: no abdominal pain, no nausea, no vomiting


Genitourinary : no dysuria, no hematuria


Musculoskeletal: no joint swelling or muscle ache 


Integumentary: no rash, no pruritis


Neurological: no parathesias, no numbness, no tingling


Endocrine: no cold or heat intolerance, no polyuria or polydipsia


Hematologic/Lymphatic: no easy bruising, no easy bleeding, no gland swelling


Allergic/Immunologic: no urticaria, no angioedema.











Medications and Allergies


                                    Allergies











Allergy/AdvReac Type Severity Reaction Status Date / Time


 


No Known Allergies Allergy   Verified 12/20/19 15:20











                                Home Medications











 Medication  Instructions  Recorded  Confirmed  Last Taken  Type


 


Metoclopramide [Reglan TAB] 10 mg PO QID PRN #30 tab 12/21/19 05/16/20 Unknown 

Rx


 


Multivitamin with Folic Acid [Cvs 400 mcg PO QDAY #30 tablet 12/21/19 05/16/20 

Unknown Rx





One Daily Essential Tablet]     


 


Esomeprazole Magnesium [Nexium 20 mg PO DAILY #30 tab 04/18/20 05/16/20 Unknown 

Rx





24Hr]     


 


AtorvaSTATin [Lipitor] 40 mg PO QHS #30 tablet 04/20/20 05/16/20 Unknown Rx


 


Metoprolol [Lopressor TAB] 50 mg PO BID #60 tablet 04/20/20 05/16/20 Unknown Rx


 


Multivitamin Tab [Multiple Vitamin 1 each PO QDAY  tablet 04/20/20 05/16/20 

Unknown Rx





TAB (Theragran)]     


 


lisinopriL [Zestril TAB] 5 mg PO QDAY #30 04/20/20 05/16/20 Unknown Rx


 


Gabapentin 300 mg PO QDAY 05/15/20 05/16/20 Unknown History


 


metFORMIN [Glucophage] 500 mg PO BID 05/15/20 05/16/20 Unknown History











Active Meds: 


Active Medications





Aspirin (Baby Aspirin)  81 mg PO QDAY [facility]


Atorvastatin Calcium (Lipitor)  40 mg PO QHS [facility]


Metoclopramide HCl (Reglan)  10 mg PO QID PRN


   PRN Reason: Nausea


Metoprolol Tartrate (Metoprolol)  50 mg PO BID [facility]


Miscellaneous Medication (Esomeprazole Magnesium [Nexium 24hr])  20 mg PO DAILY 

[facility]


Multivitamins (Theragran Tab)  1 each PO QDAY [facility]











Exam





- Physical Exam


Narrative exam: 





GENERAL:  well-developed elderly male  lying on bed appeared to be in no 

discomfort. 


HEENT: Normocephalic.  Atraumatic.  No conjunctival congestion or icterus. 

Patient has moist mucous membranes.


NECK: Supple.  Trachea midline.


CHEST/LUNGS: Clear to auscultated bilaterally, breathing nonlabored. No wheezes 

crackles or rhonchi.


HEART/CARDIOVASCULAR: Regular in rate and rhythm.  S1 and S2 positive.


ABDOMEN: Abdomen is soft, nontender.  Patient has normal bowel sounds.  


SKIN: There is no rash.  Warm and dry.


NEURO:  No focal motor deficit.  Follows command.


MUSCULOSKELETAL: No joint effusion or tenderness.


EXTRIMITY: No edema, no cyanosis or clubbing.


PSYCH:  Cooperative.











- Constitutional


Vitals: 


                                        











Temp Pulse Resp BP Pulse Ox


 


 98.7 F   102 H  22   152/100   100 


 


 05/15/20 11:06  05/15/20 11:13  05/15/20 12:02  05/15/20 11:06  05/15/20 11:06














HEART Score





- HEART Score


History: Moderately suspicious


EKG: Non-specific (T wave inverted)


Age: 45-65


Risk factors: > 3 risk factors or hx of atherosclerotic disease


Troponin: 


                                        











Troponin T  < 0.010 ng/mL (0.00-0.029)   05/15/20  10:40    











Troponin: < normal limit


HEART Score: 5





- Critical Actions


Critical Actions: 4-6 pts:12-16.6% risk of adverse cardiac event. Should be 

admitted





Results





- Labs


CBC & Chem 7: 


                                 05/15/20 10:45





                                 05/15/20 12:45


Labs: 


                              Abnormal lab results











  05/15/20 05/15/20 05/15/20 Range/Units





  10:40 10:45 10:45 


 


Mono % (Auto)   9.0 H   (0.0-7.3)  %


 


PT    15.6 H  (12.2-14.9)  Sec.


 


INR    1.23 H  (0.87-1.13)  


 


Potassium  5.8 H    (3.6-5.0)  mmol/L


 


Glucose  192 H    ()  mg/dL














Assessment and Plan





Acute chest pain


-- will admit to telemetry bed


- monitor with serial CE and EKG - initial troponin negative


- will place on Aspirin, statin


- as needed SL NTG and iv morphin for pain


- Monitor BP, cont BB


- cardiac diet now, consult cardiology for further recommendation





Hyperkalemia, likely due to medication induced


-Stop lisinopril, monitor potassium level





Hypertension, monitor BP, resume home meds


-Hold lisinopril for hyperkalemia





Diabetes mellitus type 2, diet controlled


-SSI QACHS





Tobacco abuse, nicotine patch as needed





GERD, continue PPI





History of alcohol abuse


-Monitor for withdrawal





- provide DVT Px with lovenox


-Full code





EKG shows normal: sinus rhythm


Rate: tachycardia (105 bpm), nonspecific ST-T wave ayala (T wave inversion in lead

aVL)





Chest x-ray-left lung haziness.  Left upper lobe scarring

## 2020-05-15 NOTE — EMERGENCY DEPARTMENT REPORT
HPI





- General


Chief Complaint: Chest Pain


PUI?: No


Time Seen by Provider: 05/15/20 10:31





- HPI


HPI: 





Room 19








The patient is a 59-year-old male present with chief complaint of chest pain.  

Patient states his symptoms began this morning with left-sided chest pain 

radiating to his neck.  Patient describes the pain is sharp and intermittent in 

nature.  Patient admits to shortness of breath, nausea/vomiting and diaphoresis 

with this chest pain.  The patient currently gives his pain a score of 10/10.  

The patient states he's never had a heart catheterization





ED Past Medical Hx





- Past Medical History


Hx Hypertension: Yes


Hx Diabetes: Yes


Hx GERD: Yes


Additional medical history: ETOH





- Surgical History


Additional Surgical History: right arm surgery- to repair artery





- Family History


Family history: no significant





- Social History


Smoking Status: Unknown if ever smoked


Substance Use Type: None





- Medications


Home Medications: 


                                Home Medications











 Medication  Instructions  Recorded  Confirmed  Last Taken  Type


 


Metoclopramide [Reglan TAB] 10 mg PO QID PRN #30 tab 19 Unknown 

Rx


 


Multivitamin with Folic Acid [Cvs 400 mcg PO QDAY #30 tablet 19 

Unknown Rx





One Daily Essential Tablet]     


 


Esomeprazole Magnesium [Nexium 20 mg PO DAILY #30 tab 20  Unknown Rx





24Hr]     


 


Aspirin [Aspirin BABY CHEW TAB] 81 mg PO QDAY #30 tab.chew 20  Unknown Rx


 


AtorvaSTATin [Lipitor] 40 mg PO QHS #30 tablet 20  Unknown Rx


 


Metoprolol [Lopressor TAB] 50 mg PO BID #60 tablet 20  Unknown Rx


 


Multivitamin Tab [Multiple Vitamin 1 each PO QDAY  tablet 20  Unknown Rx





TAB (Theragran)]     


 


lisinopriL [Zestril TAB] 5 mg PO QDAY #30 20  Unknown Rx














ED Review of Systems


ROS: 


Stated complaint: CHEST PAIN


Other details as noted in HPI





Constitutional: diaphoresis


Eyes: denies: eye pain


ENT: denies: throat pain


Respiratory: shortness of breath


Cardiovascular: chest pain


Endocrine: no symptoms reported


Gastrointestinal: nausea, vomiting


Genitourinary: denies: dysuria


Musculoskeletal: back pain


Neurological: denies: headache





Physical Exam





- Physical Exam


Vital Signs: 


                                   Vital Signs











  05/15/20





  10:25


 


Temperature 98.7 F


 


Pulse Rate 100 H


 


Respiratory 22





Rate 


 


Blood Pressure 156/96





[Right] 


 


O2 Sat by Pulse 100





Oximetry 











Physical Exam: 





GENERAL: The patient is well-developed well-nourished male lying on stretcher 

appearing to be in mild discomfort


HEENT: Normocephalic.  Atraumatic.  Extraocular motions are intact.  Patient has

 moist mucous membranes.


NECK: Supple.  Trachea midline


CHEST/LUNGS: Clear to auscultation.  There is no respiratory distress noted.


HEART/CARDIOVASCULAR: Regular.  There is no tachycardia.  There is no gallop rub

 or murmur.


ABDOMEN: Abdomen is soft, nontender.  Patient has normal bowel sounds.  There is

 no abdominal distention.


SKIN: There is no rash.  There is no edema.  There is no diaphoresis.


NEURO: The patient is awake, alert, and oriented.  The patient is cooperative. 

The patient has normal speech


MUSCULOSKELETAL: There is no evidence of acute injury.





ED Course


                                   Vital Signs











  05/15/20





  10:25


 


Temperature 98.7 F


 


Pulse Rate 100 H


 


Respiratory 22





Rate 


 


Blood Pressure 156/96





[Right] 


 


O2 Sat by Pulse 100





Oximetry 














ED Medical Decision Making





- Lab Data


Result diagrams: 


                                 05/15/20 10:45





                                 05/15/20 10:40





                                Laboratory Tests











  05/15/20 05/15/20 05/15/20





  10:40 10:45 10:45


 


WBC   5.5 


 


RBC   4.99 


 


Hgb   14.0 


 


Hct   43.3 


 


MCV   87 


 


MCH   28 


 


MCHC   32 


 


RDW   14.9 


 


Plt Count   403 


 


Lymph % (Auto)   34.5 


 


Mono % (Auto)   9.0 H 


 


Eos % (Auto)   1.0 


 


Baso % (Auto)   1.7 


 


Lymph #   1.9 


 


Mono #   0.5 


 


Eos #   0.1 


 


Baso #   0.1 


 


Seg Neutrophils %   53.8 


 


Seg Neutrophils #   3.0 


 


PT    15.6 H


 


INR    1.23 H


 


APTT    30.9


 


Sodium  141  


 


Potassium  5.8 H  


 


Chloride  99.8  


 


Carbon Dioxide  23  


 


Anion Gap  24  


 


BUN  15  


 


Creatinine  1.0  


 


Estimated GFR  > 60  


 


BUN/Creatinine Ratio  15  


 


Glucose  192 H  


 


Calcium  9.9  


 


CK-MB (CK-2)  2.2  


 


Troponin T  < 0.010  


 


NT-Pro-B Natriuret Pep  110.7  














- EKG Data


-: EKG Interpreted by Me


EKG shows normal: sinus rhythm


Rate: tachycardia (105 bpm)





- EKG Data


When compared to previous EKG there are: previous EKG unavailable


Interpretation: nonspecific ST-T wave ayala (T wave inversion in lead aVL)





- Radiology Data


Radiology results: report reviewed (Chest x-ray), image reviewed (Chest x-ray)


interpreted by me: 





Chest x-ray-left lung haziness.  Left upper lobe scarring





[facility] [address] [place] [address] 

XRay Report Signed Patient: JANAK NESS MR#:  [id number] : 1960 

Acct:[id number] Age/Sex: 59 / M ADM Date: 05/15/20 Loc: ED Attending Dr: 

Ordering Physician: ARA MURPHY MD Date of Service: 05/15/20 Procedure(s): XR 

chest 1V ap Accession Number(s): [id number] cc: ARA MURPHY MD Fluoro Time In 

Minutes: CHEST 1 VIEW 5/15/2020 9:57 AM INDICATION / CLINICAL INFORMATION: chest

 pain. COMPARISON: One view of the chest from 2020. FINDINGS: SUPPORT 

DEVICES: None. HEART / MEDIASTINUM: No significant abnormality. LUNGS / PLEURA: 

There are similar left upper lobe pleural-parenchymal opacities. Chronic 

appearing parenchymal changes are again seen along the remainder of the left 

lung. The right lung is clear. No pneumothorax. ADDITIONAL FINDINGS: No 

significant additional findings. IMPRESSION: Stable appearance of the chest 

compared to 2020. Signer Name: Girish Sim MD Signed: 5/15/2020 11:02 AM 

Workstation Name: [id number] Transcribed By: MN Dictated By: Girish Sim MD Electronically Authenticated By: Girish Sim MD Signed Date/Time: 

05/15/20 1102 DD/DT: 05/15/20 1059 TD/TT: 














- Differential Diagnosis


ACS, pericarditis, GERD


Critical care attestation.: 


If time is entered above; I have spent that time in minutes in the direct care 

of this critically ill patient, excluding procedure time.








ED Disposition


Clinical Impression: 


 Chest pain





Disposition:  OP ADMIT IP TO THIS HOSP


Is pt being admited?: Yes


Does the pt Need Aspirin: Yes


Condition: Fair


Instructions:  Chest Pain (ED)


Time of Disposition: 11:33 (Hospitalist paged)

## 2020-05-16 VITALS — SYSTOLIC BLOOD PRESSURE: 162 MMHG | DIASTOLIC BLOOD PRESSURE: 85 MMHG

## 2020-05-16 RX ADMIN — METOPROLOL TARTRATE SCH MG: 50 TABLET, FILM COATED ORAL at 10:04

## 2020-05-16 NOTE — DISCHARGE SUMMARY
Providers





- Providers


Date of Admission: 


05/15/20 11:42





Date of discharge: 05/16/20


Attending physician: 


ROLY MACDONALD





                                        





05/15/20 15:09


Consult to Physician [CONS] Routine 


   Comment: 


   Consulting Provider: PRABHJOT ESPARZA


   Physician Instructions: 


   Reason For Exam: chest pain











Primary care physician: 


[facility], MD








Hospitalization


Condition: Fair


Hospital course: 





The patient is a 59-year-old male with h/o HTN, DM, GERD present with chief 

complaint of chest pain.  Patient was admitted last month and was evaluated by 

extensive cardiac work-up.  In the ER he has normal troponin, mild T wave 

inversion.  He was admitted in observation status for further evaluation.  He 

was monitored with serial troponin which remained normal, cardiology consulted 

and recommended no further work-up as he has negative cardiac cath last month.  

Patient was recommended to be abstaining from alcohol and tobacco abuse.  

Patient was then discharged home in stable condition with outpatient follow-up.





Echo 4/18/2020 reviewed - EF 50-55%; normal LV diastolic filling.


MPI stress test 4/18/20 showed reversible ischemia with EF of 40%


S/p LHC 4/20/20 which showed mild CAD. Cont present cardiac regimen. 





EKG 05/15/20 shows normal sinus rhythm


Rate: tachycardia (105 bpm), nonspecific ST-T wave ayala (T wave inversion in lead

 aVL)


Chest x-ray 05/15/20: No infiltrates





Discharge diagnosis:





Acute chest pain, resolved


-Likely due to GERD, placed on PPI on discharge





Hyperkalemia, likely due to medication


-Lisinopril discontinued





Hypertension, continue home meds





Diabetes mellitus type 2, continue metformin





Tobacco help abuse/alcohol abuse, counseled for cessation





GERD, continue PPI





Physical exam:


GENERAL:  well-developed elderly male  lying on bed appeared to be in no 

discomfort. 


HEENT: Normocephalic.  Atraumatic.  No conjunctival congestion or icterus. 

Patient has moist mucous membranes.


NECK: Supple.  Trachea midline.


CHEST/LUNGS: Clear to auscultated bilaterally, breathing nonlabored. No wheezes 

crackles or rhonchi.


HEART/CARDIOVASCULAR: Regular in rate and rhythm.  S1 and S2 positive.


ABDOMEN: Abdomen is soft, nontender.  Patient has normal bowel sounds.  


SKIN: There is no rash.  Warm and dry.


NEURO:  No focal motor deficit.  Follows command.


MUSCULOSKELETAL: No joint effusion or tenderness.


EXTRIMITY: No edema, no cyanosis or clubbing.


PSYCH:  Cooperative.

















Disposition: DC-01 TO HOME OR SELFCARE


Time spent for discharge: 34 minutes





Core Measure Documentation





- Palliative Care


Palliative Care/ Comfort Measures: Not Applicable





- Core Measures


Any of the following diagnoses?: none





Exam





- Constitutional


Vitals: 


                                        











Temp Pulse Resp BP Pulse Ox


 


 99.1 F   95 H  18   142/85   100 


 


 05/16/20 08:17  05/16/20 10:04  05/16/20 08:17  05/16/20 10:04  05/16/20 08:17














Plan


Activity: advance as tolerated


Weight Bearing Status: Weight Bear as Tolerated


Diet: low fat, low salt


Special Instructions: record daily BP diary, smoking cessation


Follow up with: 


[facility] [Primary Care Provider] - 3-5 Days


CONNER MCFARLAND MD [Staff Physician] - 7 Days

## 2020-05-16 NOTE — CONSULTATION
History of Present Illness


Consult date: 05/16/20


Requesting physician: ROLY MACDONALD


Consult reason: chest pain


History of present illness: 


59-year-old male with diabetes hypertension having left-sided sharp pain lasting

for a few seconds no nausea no vomiting.. no fever. pt is cp free now. negative 

trop and recent cardiac workup


Echo 4/18/2020 reviewed - EF 50-55%; normal LV diastolic filling.


MPI stress test 4/18/20 showed reversible ischemia with EF of 40%


S/p LHC 4/20/20 which showed mild CAD. Cont present cardiac regimen








Past History


Past Medical History: diabetes, hyperlipidemia


Past Surgical History: denies: Other


Social history: denies: no significant social history


Family history: denies: no significant family history





Medications and Allergies


                                    Allergies











Allergy/AdvReac Type Severity Reaction Status Date / Time


 


No Known Allergies Allergy   Verified 12/20/19 15:20











                                Home Medications











 Medication  Instructions  Recorded  Confirmed  Last Taken  Type


 


Metoclopramide [Reglan TAB] 10 mg PO QID PRN #30 tab 12/21/19 05/16/20 Unknown 

Rx


 


Multivitamin with Folic Acid [Cvs 400 mcg PO QDAY #30 tablet 12/21/19 05/16/20 

Unknown Rx





One Daily Essential Tablet]     


 


Esomeprazole Magnesium [Nexium 20 mg PO DAILY #30 tab 04/18/20 05/16/20 Unknown 

Rx





24Hr]     


 


AtorvaSTATin [Lipitor] 40 mg PO QHS #30 tablet 04/20/20 05/16/20 Unknown Rx


 


Metoprolol [Lopressor TAB] 50 mg PO BID #60 tablet 04/20/20 05/16/20 Unknown Rx


 


Multivitamin Tab [Multiple Vitamin 1 each PO QDAY  tablet 04/20/20 05/16/20 

Unknown Rx





TAB (Theragran)]     


 


lisinopriL [Zestril TAB] 5 mg PO QDAY #30 04/20/20 05/16/20 Unknown Rx


 


Gabapentin 300 mg PO QDAY 05/15/20 05/16/20 Unknown History


 


metFORMIN [Glucophage] 500 mg PO BID 05/15/20 05/16/20 Unknown History











Active Meds: 


Active Medications





Aspirin (Baby Aspirin)  81 mg PO QDAY Formerly [facility]


   Last Admin: 05/16/20 10:01 Dose:  81 mg


   Documented by: 


Atorvastatin Calcium (Lipitor)  40 mg PO QHS Formerly [facility]


   Last Admin: 05/15/20 21:50 Dose:  40 mg


   Documented by: 


Gabapentin (Gabapentin)  300 mg PO QDAY Formerly [facility]


   Last Admin: 05/16/20 10:01 Dose:  300 mg


   Documented by: 


Insulin Human Regular (Humulin R)  0 units SUB-Q ACHS Formerly [facility]; Protocol


Metoclopramide HCl (Reglan)  10 mg PO QID PRN


   PRN Reason: Nausea


Metoprolol Tartrate (Metoprolol)  50 mg PO BID Formerly [facility]


   Last Admin: 05/16/20 10:04 Dose:  50 mg


   Documented by: 


Multivitamins (Theragran Tab)  1 each PO QDAY Formerly [facility]


   Last Admin: 05/16/20 10:02 Dose:  1 each


   Documented by: 


Pantoprazole Sodium (Protonix)  20 mg PO QDAY Formerly [facility]


   Last Admin: 05/16/20 10:02 Dose:  20 mg


   Documented by: 











Review of Systems


All systems: negative (hpi)





Physical Examination


                                   Vital Signs











Temp Pulse Resp BP Pulse Ox


 


 98.7 F   100 H  22   156/96   100 


 


 05/15/20 10:25  05/15/20 10:25  05/15/20 10:25  05/15/20 10:25  05/15/20 10:25











General appearance: no acute distress, well-nourished


HEENT: Positive: PERRL, Mucus Membranes Moist


Neck: Positive: neck supple, trachea midline


Cardiac: Positive: Reg Rate and Rhythm, S1/S2.  Negative: Audible Murmur


Lungs: Positive: clear to auscultation, Normal Breath Sounds


Neuro: Positive: Grossly Intact


Abdomen: Positive: Soft, Active Bowel Sounds.  Negative: Tender, Distended


Male genitourinary: Positive: normal


Skin: Positive: Clear


Incision: Cardiac Cath Site


Musculoskeletal: No Pain, Normal Range of Motion


Extremities: Present: normal.  Absent: edema





Results





                                 05/15/20 10:45





                                 05/15/20 12:45


                                 Cardiac Enzymes











  05/15/20 Range/Units





  10:40 


 


CK-MB (CK-2)  2.2  (0.0-4.0)  ng/mL








                                   Coagulation











  05/15/20 Range/Units





  10:45 


 


PT  15.6 H  (12.2-14.9)  Sec.


 


INR  1.23 H  (0.87-1.13)  


 


APTT  30.9  (24.2-36.6)  Sec.








                                       CBC











  05/15/20 Range/Units





  10:45 


 


WBC  5.5  (4.5-11.0)  K/mm3


 


RBC  4.99  (3.65-5.03)  M/mm3


 


Hgb  14.0  (11.8-15.2)  gm/dl


 


Hct  43.3  (35.5-45.6)  %


 


Plt Count  403  (140-440)  K/mm3


 


Lymph #  1.9  (1.2-5.4)  K/mm3


 


Mono #  0.5  (0.0-0.8)  K/mm3


 


Eos #  0.1  (0.0-0.4)  K/mm3


 


Baso #  0.1  (0.0-0.1)  K/mm3








                          Comprehensive Metabolic Panel











  05/15/20 05/15/20 Range/Units





  10:40 12:45 


 


Sodium  141   (137-145)  mmol/L


 


Potassium  5.8 H  4.1  D  (3.6-5.0)  mmol/L


 


Chloride  99.8   ()  mmol/L


 


Carbon Dioxide  23   (22-30)  mmol/L


 


BUN  15   (9-20)  mg/dL


 


Creatinine  1.0   (0.8-1.5)  mg/dL


 


Glucose  192 H   ()  mg/dL


 


Calcium  9.9   (8.4-10.2)  mg/dL














EKG interpretations





- Telemetry


EKG Rhythm: Sinus Rhythm (nsr non specific st-t)





Assessment and Plan


Patient with atypical chest pain negative cardiac enzymes no change in EKG and 

negative cardiac cath may be discharged from a cardiovascular point of view








- Patient Problems


(1) Chest pain


Current Visit: Yes   Status: Acute   


Qualifiers: 


   Chest pain type: unspecified   Qualified Code(s): R07.9 - Chest pain, 

unspecified   





(2) Hypertension


Current Visit: No   Status: Chronic   


Qualifiers: 


   Hypertension type: essential hypertension   Qualified Code(s): I10 - 

Essential (primary) hypertension   





(3) Hypertriglyceridemia


Current Visit: No   Status: Chronic   





(4) Type 2 diabetes mellitus


Current Visit: No   Status: Chronic   


Qualifiers: 


   Diabetes mellitus long term insulin use: unspecified long term insulin use 

status

## 2020-07-10 ENCOUNTER — HOSPITAL ENCOUNTER (EMERGENCY)
Dept: HOSPITAL 5 - ED | Age: 60
Discharge: HOME | End: 2020-07-10
Payer: SELF-PAY

## 2020-07-10 VITALS — DIASTOLIC BLOOD PRESSURE: 54 MMHG | SYSTOLIC BLOOD PRESSURE: 122 MMHG

## 2020-07-10 DIAGNOSIS — K21.9: ICD-10-CM
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
## 2020-07-10 DIAGNOSIS — S39.012A: Primary | ICD-10-CM
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
## 2020-07-10 DIAGNOSIS — Z79.899: ICD-10-CM

## 2020-07-10 DIAGNOSIS — E11.9: ICD-10-CM

## 2020-07-10 DIAGNOSIS — I10: ICD-10-CM
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
## 2020-07-10 DIAGNOSIS — Z98.890: ICD-10-CM

## 2020-07-10 LAB
BACTERIA #/AREA URNS HPF: (no result) /HPF
BASOPHILS # (AUTO): 0 K/MM3 (ref 0–0.1)
BASOPHILS NFR BLD AUTO: 0.5 % (ref 0–1.8)
BILIRUB UR QL STRIP: (no result)
BLOOD UR QL VISUAL: (no result)
BUN SERPL-MCNC: 9 MG/DL (ref 9–20)
BUN/CREAT SERPL: 15 %
CALCIUM SERPL-MCNC: 8.6 MG/DL (ref 8.4–10.2)
EOSINOPHIL # BLD AUTO: 0 K/MM3 (ref 0–0.4)
EOSINOPHIL NFR BLD AUTO: 0.8 % (ref 0–4.3)
HCT VFR BLD CALC: 37.7 % (ref 35.5–45.6)
HEMOLYSIS INDEX: 2
HGB BLD-MCNC: 12.1 GM/DL (ref 11.8–15.2)
LYMPHOCYTES # BLD AUTO: 2.1 K/MM3 (ref 1.2–5.4)
LYMPHOCYTES NFR BLD AUTO: 34.9 % (ref 13.4–35)
MCHC RBC AUTO-ENTMCNC: 32 % (ref 32–34)
MCV RBC AUTO: 86 FL (ref 84–94)
MONOCYTES # (AUTO): 0.7 K/MM3 (ref 0–0.8)
MONOCYTES % (AUTO): 11.4 % (ref 0–7.3)
MUCOUS THREADS #/AREA URNS HPF: (no result) /HPF
PH UR STRIP: 7 [PH] (ref 5–7)
PLATELET # BLD: 222 K/MM3 (ref 140–440)
PROT UR STRIP-MCNC: (no result) MG/DL
RBC # BLD AUTO: 4.36 M/MM3 (ref 3.65–5.03)
RBC #/AREA URNS HPF: < 1 /HPF (ref 0–6)
UROBILINOGEN UR-MCNC: < 2 MG/DL (ref ?–2)
WBC #/AREA URNS HPF: < 1 /HPF (ref 0–6)

## 2020-07-10 PROCEDURE — 96374 THER/PROPH/DIAG INJ IV PUSH: CPT

## 2020-07-10 PROCEDURE — 93005 ELECTROCARDIOGRAM TRACING: CPT

## 2020-07-10 PROCEDURE — 80048 BASIC METABOLIC PNL TOTAL CA: CPT

## 2020-07-10 PROCEDURE — 81001 URINALYSIS AUTO W/SCOPE: CPT

## 2020-07-10 PROCEDURE — 71045 X-RAY EXAM CHEST 1 VIEW: CPT

## 2020-07-10 PROCEDURE — 96376 TX/PRO/DX INJ SAME DRUG ADON: CPT

## 2020-07-10 PROCEDURE — 74176 CT ABD & PELVIS W/O CONTRAST: CPT

## 2020-07-10 PROCEDURE — 36415 COLL VENOUS BLD VENIPUNCTURE: CPT

## 2020-07-10 PROCEDURE — 99285 EMERGENCY DEPT VISIT HI MDM: CPT

## 2020-07-10 PROCEDURE — 85025 COMPLETE CBC W/AUTO DIFF WBC: CPT

## 2020-07-10 PROCEDURE — 96375 TX/PRO/DX INJ NEW DRUG ADDON: CPT

## 2020-07-10 PROCEDURE — 80307 DRUG TEST PRSMV CHEM ANLYZR: CPT

## 2020-07-10 PROCEDURE — 84484 ASSAY OF TROPONIN QUANT: CPT

## 2020-07-10 NOTE — EMERGENCY DEPARTMENT REPORT
ED Back Pain/Injury HPI





- General


Chief Complaint: Chest Pain


Stated Complaint: CHEST PAIN


Time Seen by Provider: 07/10/20 11:41


Source: EMS


Limitations: No Limitations





- History of Present Illness


Initial Comments: 





60-year-old male with a past medical history hypertension, alcohol abuse, 

diabetes, and GERD presents the hospital planing of left sided posterior 

thorax/back pain since this a.m.  Pain started on his way to work.  Patient does

perform heavy lifting at his job.  Pain is constant and sharp in nature rated 

8/10 in intensity.  Pain is worse with palpation and movement.  Patient did have

nausea and vomiting.  Patient states he does have some mild pain radiating to 

the chest.  Patient denies fever, dysuria, calf tenderness, or leg edema





Previous medical record review.  Patient has had a  cardiac cath in April 20, 2020 showing mild CAD and negative troponin still when chest pain admission in 

May





Patient states he is no longer drinking every day but does admit to drinking 

during [birth year]











- Related Data


                                Home Medications











 Medication  Instructions  Recorded  Confirmed  Last Taken


 


Gabapentin 300 mg PO QDAY 05/15/20 05/16/20 Unknown


 


metFORMIN [Glucophage] 500 mg PO BID 05/15/20 05/16/20 Unknown








                                  Previous Rx's











 Medication  Instructions  Recorded  Last Taken  Type


 


Metoclopramide [Reglan TAB] 10 mg PO QID PRN #30 tab 12/21/19 Unknown Rx


 


Multivitamin with Folic Acid [Cvs 400 mcg PO QDAY #30 tablet 12/21/19 Unknown Rx





One Daily Essential Tablet]    


 


Esomeprazole Magnesium [Nexium 20 mg PO DAILY #30 tab 04/18/20 Unknown Rx





24Hr]    


 


AtorvaSTATin [Lipitor] 40 mg PO QHS #30 tablet 04/20/20 Unknown Rx


 


Metoprolol [Lopressor TAB] 50 mg PO BID #60 tablet 04/20/20 Unknown Rx


 


Multivitamin Tab [Multiple Vitamin 1 each PO QDAY  tablet 04/20/20 Unknown Rx





TAB (Theragran)]    


 


HYDROcodone/APAP 5-325 [Hobbs 1 each PO Q4HR PRN #15 tablet 07/10/20 Unknown Rx





5/325]    


 


Ibuprofen [Motrin] 600 mg PO Q8H PRN #20 tablet 07/10/20 Unknown Rx


 


Ondansetron [Zofran Odt] 4 mg PO Q8HR PRN #15 tab.rapdis 07/10/20 Unknown Rx











                                    Allergies











Allergy/AdvReac Type Severity Reaction Status Date / Time


 


No Known Allergies Allergy   Verified 12/20/19 15:20














ED Review of Systems


ROS: 


Stated complaint: CHEST PAIN


Other details as noted in HPI





Comment: All other systems reviewed and negative





ED Past Medical Hx





- Past Medical History


Previous Medical History?: Yes


Hx Hypertension: Yes


Hx Diabetes: Yes


Hx GERD: Yes


Additional medical history: ETOH





- Surgical History


Past Surgical History?: Yes


Additional Surgical History: right arm surgery- to repair artery





- Social History


Smoking Status: Unknown if ever smoked


Substance Use Type: Alcohol





- Medications


Home Medications: 


                                Home Medications











 Medication  Instructions  Recorded  Confirmed  Last Taken  Type


 


Metoclopramide [Reglan TAB] 10 mg PO QID PRN #30 tab 12/21/19 05/16/20 Unknown 

Rx


 


Multivitamin with Folic Acid [Cvs 400 mcg PO QDAY #30 tablet 12/21/19 05/16/20 

Unknown Rx





One Daily Essential Tablet]     


 


Esomeprazole Magnesium [Nexium 20 mg PO DAILY #30 tab 04/18/20 05/16/20 Unknown 

Rx





24Hr]     


 


AtorvaSTATin [Lipitor] 40 mg PO QHS #30 tablet 04/20/20 05/16/20 Unknown Rx


 


Metoprolol [Lopressor TAB] 50 mg PO BID #60 tablet 04/20/20 05/16/20 Unknown Rx


 


Multivitamin Tab [Multiple Vitamin 1 each PO QDAY  tablet 04/20/20 05/16/20 

Unknown Rx





TAB (Theragran)]     


 


Gabapentin 300 mg PO QDAY 05/15/20 05/16/20 Unknown History


 


metFORMIN [Glucophage] 500 mg PO BID 05/15/20 05/16/20 Unknown History


 


HYDROcodone/APAP 5-325 [Hobbs 1 each PO Q4HR PRN #15 tablet 07/10/20  Unknown Rx





5/325]     


 


Ibuprofen [Motrin] 600 mg PO Q8H PRN #20 tablet 07/10/20  Unknown Rx


 


Ondansetron [Zofran Odt] 4 mg PO Q8HR PRN #15 tab.rapdis 07/10/20  Unknown Rx














ED Physical Exam





- General


Limitations: No Limitations





- Other


Other exam information: 





General: No acute distress


Head: Atraumatic


Eyes: normal appearance


ENT: Moist mucous membranes


Neck: Normal appearance, no midline tenderness


Chest: Clear to auscultation bilaterally


CV: Regular rate and rhythm


Abdomen: Soft, normal bowel sounds, nontender, nondistended, no rebound or 

guarding


Back: Normal inspection, no midline tenderness, reproducible tenderness to the 

left paraspinal muscles of the lower back and to a lesser extent the left lower 

posterior thorax


Extremity: Normal inspection, full range of motion


Neuro: Alert O x 3, no facial asymmetry, speech clear, no gross motor sensory 

deficit


Psych: Appropriate behavior


Skin: No rash





ED Course


                                   Vital Signs











  07/10/20 07/10/20





  09:57 15:38


 


Temperature 97.9 F 


 


Pulse Rate 98 H 82


 


Respiratory 22 18





Rate  


 


Blood Pressure 178/98 


 


Blood Pressure  118/52





[Left]  


 


O2 Sat by Pulse 97 98





Oximetry  














ED Medical Decision Making





- Lab Data


Result diagrams: 


                                 07/10/20 11:23





                                 07/10/20 11:23








                                   Lab Results











  07/10/20 07/10/20 07/10/20 Range/Units





  11:23 11:23 12:57 


 


WBC  5.9    (4.5-11.0)  K/mm3


 


RBC  4.36    (3.65-5.03)  M/mm3


 


Hgb  12.1    (11.8-15.2)  gm/dl


 


Hct  37.7    (35.5-45.6)  %


 


MCV  86    (84-94)  fl


 


MCH  28    (28-32)  pg


 


MCHC  32    (32-34)  %


 


RDW  15.5 H    (13.2-15.2)  %


 


Plt Count  222    (140-440)  K/mm3


 


Lymph % (Auto)  34.9    (13.4-35.0)  %


 


Mono % (Auto)  11.4 H    (0.0-7.3)  %


 


Eos % (Auto)  0.8    (0.0-4.3)  %


 


Baso % (Auto)  0.5    (0.0-1.8)  %


 


Lymph #  2.1    (1.2-5.4)  K/mm3


 


Mono #  0.7    (0.0-0.8)  K/mm3


 


Eos #  0.0    (0.0-0.4)  K/mm3


 


Baso #  0.0    (0.0-0.1)  K/mm3


 


Seg Neutrophils %  52.4    (40.0-70.0)  %


 


Seg Neutrophils #  3.1    (1.8-7.7)  K/mm3


 


Sodium   141   (137-145)  mmol/L


 


Potassium   4.0   (3.6-5.0)  mmol/L


 


Chloride   107.6 H   ()  mmol/L


 


Carbon Dioxide   20 L   (22-30)  mmol/L


 


Anion Gap   17   mmol/L


 


BUN   9   (9-20)  mg/dL


 


Creatinine   0.6 L   (0.8-1.5)  mg/dL


 


Estimated GFR   > 60   ml/min


 


BUN/Creatinine Ratio   15   %


 


Glucose   95   ()  mg/dL


 


Calcium   8.6   (8.4-10.2)  mg/dL


 


Troponin T   < 0.010   (0.00-0.029)  ng/mL


 


Urine Color    Colorless  (Yellow)  


 


Urine Turbidity    Clear  (Clear)  


 


Urine pH    7.0  (5.0-7.0)  


 


Ur Specific Gravity    1.003  (1.003-1.030)  


 


Urine Protein    <15 mg/dl  (Negative)  mg/dL


 


Urine Glucose (UA)    Neg  (Negative)  mg/dL


 


Urine Ketones    Neg  (Negative)  mg/dL


 


Urine Blood    Neg  (Negative)  


 


Urine Nitrite    Neg  (Negative)  


 


Urine Bilirubin    Neg  (Negative)  


 


Urine Urobilinogen    < 2.0  (<2.0)  mg/dL


 


Ur Leukocyte Esterase    Neg  (Negative)  


 


Urine WBC (Auto)    < 1.0  (0.0-6.0)  /HPF


 


Urine RBC (Auto)    < 1.0  (0.0-6.0)  /HPF


 


U Epithel Cells (Auto)    1.0  (0-13.0)  /HPF


 


Urine Bacteria (Auto)    1+  (Negative)  /HPF


 


Urine Mucus    Few  /HPF


 


Urine Opiates Screen     


 


Urine Methadone Screen     


 


Ur Barbiturates Screen     


 


Ur Phencyclidine Scrn     


 


Ur Amphetamines Screen     


 


U Benzodiazepines Scrn     


 


Urine Cocaine Screen     


 


U Marijuana (THC) Screen     


 


Drugs of Abuse Note     














  07/10/20 Range/Units





  12:57 


 


WBC   (4.5-11.0)  K/mm3


 


RBC   (3.65-5.03)  M/mm3


 


Hgb   (11.8-15.2)  gm/dl


 


Hct   (35.5-45.6)  %


 


MCV   (84-94)  fl


 


MCH   (28-32)  pg


 


MCHC   (32-34)  %


 


RDW   (13.2-15.2)  %


 


Plt Count   (140-440)  K/mm3


 


Lymph % (Auto)   (13.4-35.0)  %


 


Mono % (Auto)   (0.0-7.3)  %


 


Eos % (Auto)   (0.0-4.3)  %


 


Baso % (Auto)   (0.0-1.8)  %


 


Lymph #   (1.2-5.4)  K/mm3


 


Mono #   (0.0-0.8)  K/mm3


 


Eos #   (0.0-0.4)  K/mm3


 


Baso #   (0.0-0.1)  K/mm3


 


Seg Neutrophils %   (40.0-70.0)  %


 


Seg Neutrophils #   (1.8-7.7)  K/mm3


 


Sodium   (137-145)  mmol/L


 


Potassium   (3.6-5.0)  mmol/L


 


Chloride   ()  mmol/L


 


Carbon Dioxide   (22-30)  mmol/L


 


Anion Gap   mmol/L


 


BUN   (9-20)  mg/dL


 


Creatinine   (0.8-1.5)  mg/dL


 


Estimated GFR   ml/min


 


BUN/Creatinine Ratio   %


 


Glucose   ()  mg/dL


 


Calcium   (8.4-10.2)  mg/dL


 


Troponin T   (0.00-0.029)  ng/mL


 


Urine Color   (Yellow)  


 


Urine Turbidity   (Clear)  


 


Urine pH   (5.0-7.0)  


 


Ur Specific Gravity   (1.003-1.030)  


 


Urine Protein   (Negative)  mg/dL


 


Urine Glucose (UA)   (Negative)  mg/dL


 


Urine Ketones   (Negative)  mg/dL


 


Urine Blood   (Negative)  


 


Urine Nitrite   (Negative)  


 


Urine Bilirubin   (Negative)  


 


Urine Urobilinogen   (<2.0)  mg/dL


 


Ur Leukocyte Esterase   (Negative)  


 


Urine WBC (Auto)   (0.0-6.0)  /HPF


 


Urine RBC (Auto)   (0.0-6.0)  /HPF


 


U Epithel Cells (Auto)   (0-13.0)  /HPF


 


Urine Bacteria (Auto)   (Negative)  /HPF


 


Urine Mucus   /HPF


 


Urine Opiates Screen  Negative  


 


Urine Methadone Screen  Negative  


 


Ur Barbiturates Screen  Negative  


 


Ur Phencyclidine Scrn  Negative  


 


Ur Amphetamines Screen  Negative  


 


U Benzodiazepines Scrn  Negative  


 


Urine Cocaine Screen  Negative  


 


U Marijuana (THC) Screen  Negative  


 


Drugs of Abuse Note  Disclamer  














- EKG Data


-: EKG Interpreted by Me


EKG shows normal: sinus rhythm, ST-T waves (No ST elevation MI)


Rate: normal (81)





- EKG Data


When compared to previous EKG there are: no significant change





- Radiology Data


Radiology results: report reviewed





CHEST 1 VIEW 7/10/2020 9:47 AM INDICATION / CLINICAL INFORMATION: Chest Pain. 

COMPARISON: 5/15/2020 FINDINGS: SUPPORT DEVICES: None. HEART / MEDIASTINUM: No 

significant abnormality. LUNGS / PLEURA: Stable pleural-parenchymal scarring in 

the left upper lobe. No pneumothorax. ADDITIONAL FINDINGS: No significant 

additional findings. IMPRESSION: 1. No adverse change from the prior exam. 





CT abdomen pelvis wo con INDICATION: left flank pain. TECHNIQUE: All CT scans at

 this location are performed using the following dose modulation technique: 

Automated exposure control. Helical slices were obtained through the abdomen and

 pelvis. No contrast is administered. COMPARISON: CT scan dated 10/22/2019 

FINDINGS: Abdomen: There is bronchiectasis noted in the left lower lobe there 

are patchy peripheral groundglass opacities in the lung bases. The liver, 

spleen, pancreas, adrenal glands, and kidneys show no acute abnormality. There 

is no hydronephrosis. There are no renal or ureteral calculi. The aorta is nor

mal in diameter. There is no adenopathy. The appendix is unremarkable. There is 

no obstruction, inflammation, or free air. Pelvis: Atherosclerotic 

calcifications are noted in the iliac arteries. There is no adenopathy. There is

 no obstruction or inflammation. On review of bone windows, no acute osseous 

abnormalities are seen. IMPRESSION: 1. There is no obstruction, inflammation, or

 free air in the abdomen or pelvis. There are no renal or ureteral calculi. 

There is no hydronephrosis. There are peripheral groundglass opacities in the 

right and left lower lobe the lung. These are mild. These are not specific. His 

could represent mild focal edema or lower airways disease. The possibility that 

this represents an atypical or viral pneumonia is considered. 





- Medical Decision Making





Pt with reproducible left paraspinal muscular pain with normal labs, urine, and 

CT abdomen pelvis noncontrast.  Patient reported chest pain in triage and has a 

EKG similar to previous, troponin negative x2, and has had a cardiac cath 

performed this year.  Pain improved with Dilaudid, Toradol, and Zofran.  Patient

 will be discharged with pain medications for muscle skeletal back pain








incidental nonspecific lung findings noted. . pt does not have infectious sx. 


Critical Care Time: No


Critical care attestation.: 


If time is entered above; I have spent that time in minutes in the direct care 

of this critically ill patient, excluding procedure time.








ED Disposition


Clinical Impression: 


 Low back strain





Disposition: DC-01 TO HOME OR SELFCARE


Is pt being admited?: No


Does the pt Need Aspirin: No


Condition: Stable


Instructions:  Low Back Strain (ED)


Additional Instructions: 


Take the medication as prescribed.  Follow-up with your doctor or doctor/clinic 

provided.  Return if symptoms worsen as indicated by your discharge i

nstructions.


Prescriptions: 


Ibuprofen [Motrin] 600 mg PO Q8H PRN #20 tablet


 PRN Reason: Pain


HYDROcodone/APAP 5-325 [Hobbs 5/325] 1 each PO Q4HR PRN #15 tablet


 PRN Reason: Pain


Ondansetron [Zofran Odt] 4 mg PO Q8HR PRN #15 tab.rapdis


 PRN Reason: Nausea And Vomiting


Referrals: 


PRIMARY CARE,MD [Primary Care Provider] - 3-5 Days


LINNETTE TOLLIVER MD [Staff Physician] - 3-5 Days


[facility] [Provider Group] - 3-5 Days

## 2020-07-10 NOTE — CAT SCAN REPORT
CT abdomen pelvis wo con



INDICATION:

left flank pain.



TECHNIQUE:

All CT scans at this location are performed using the following dose modulation technique: Automated 
exposure control. Helical slices were obtained through the abdomen and pelvis. No contrast is adminis
tered. 



COMPARISON:

CT scan dated 10/22/2019



FINDINGS:

Abdomen: There is bronchiectasis noted in the left lower lobe there are patchy peripheral groundglass
 opacities in the lung bases.



The liver, spleen, pancreas, adrenal glands, and kidneys show no acute abnormality. There is no hydro
nephrosis. There are no renal or ureteral calculi.



The aorta is normal in diameter. There is no adenopathy. The appendix is unremarkable.



There is no obstruction, inflammation, or free air.



Pelvis: Atherosclerotic calcifications are noted in the iliac arteries. There is no adenopathy. There
 is no obstruction or inflammation.



On review of bone windows, no acute osseous abnormalities are seen.







IMPRESSION:

1. There is no obstruction, inflammation, or free air in the abdomen or pelvis. There are no renal or
 ureteral calculi. There is no hydronephrosis.



There are peripheral groundglass opacities in the right and left lower lobe the lung. These are mild.
 These are not specific. His could represent mild focal edema or lower airways disease. The possibili
ty that this represents an atypical or viral pneumonia is considered.



Signer Name: Dick Guzman MD 

Signed: 7/10/2020 1:46 PM

Workstation Name: [id number]

## 2020-07-10 NOTE — XRAY REPORT
CHEST 1 VIEW 7/10/2020 9:47 AM



INDICATION / CLINICAL INFORMATION:

Chest Pain.



COMPARISON: 

5/15/2020



FINDINGS:



SUPPORT DEVICES: None.



HEART / MEDIASTINUM: No significant abnormality. 



LUNGS / PLEURA: Stable pleural-parenchymal scarring in the left upper lobe. No pneumothorax. 



ADDITIONAL FINDINGS: No significant additional findings.



IMPRESSION:

1. No adverse change from the prior exam.



Signer Name: Khoi Thurman MD 

Signed: 7/10/2020 10:49 AM

Workstation Name: [id number]

## 2020-08-07 ENCOUNTER — HOSPITAL ENCOUNTER (EMERGENCY)
Dept: HOSPITAL 5 - ED | Age: 60
Discharge: HOME | End: 2020-08-07
Payer: SELF-PAY

## 2020-08-07 VITALS — DIASTOLIC BLOOD PRESSURE: 60 MMHG | SYSTOLIC BLOOD PRESSURE: 100 MMHG

## 2020-08-07 DIAGNOSIS — J45.909: ICD-10-CM

## 2020-08-07 DIAGNOSIS — E11.9: ICD-10-CM

## 2020-08-07 DIAGNOSIS — Z98.890: ICD-10-CM

## 2020-08-07 DIAGNOSIS — F10.120: ICD-10-CM

## 2020-08-07 DIAGNOSIS — M94.0: Primary | ICD-10-CM

## 2020-08-07 DIAGNOSIS — E86.0: ICD-10-CM

## 2020-08-07 DIAGNOSIS — K21.9: ICD-10-CM

## 2020-08-07 DIAGNOSIS — F17.200: ICD-10-CM

## 2020-08-07 DIAGNOSIS — Z79.899: ICD-10-CM

## 2020-08-07 DIAGNOSIS — I10: ICD-10-CM

## 2020-08-07 LAB
ALBUMIN SERPL-MCNC: 4.7 G/DL (ref 3.9–5)
ALT SERPL-CCNC: 34 UNITS/L (ref 7–56)
APTT BLD: 24.5 SEC. (ref 24.2–36.6)
BASOPHILS # (AUTO): 0.1 K/MM3 (ref 0–0.1)
BASOPHILS NFR BLD AUTO: 1 % (ref 0–1.8)
BUN SERPL-MCNC: 24 MG/DL (ref 9–20)
BUN SERPL-MCNC: 26 MG/DL (ref 9–20)
BUN/CREAT SERPL: 19 %
BUN/CREAT SERPL: 20 %
CALCIUM SERPL-MCNC: 10.1 MG/DL (ref 8.4–10.2)
CALCIUM SERPL-MCNC: 10.4 MG/DL (ref 8.4–10.2)
EOSINOPHIL # BLD AUTO: 0 K/MM3 (ref 0–0.4)
EOSINOPHIL NFR BLD AUTO: 0.3 % (ref 0–4.3)
HCT VFR BLD CALC: 44.8 % (ref 35.5–45.6)
HEMOLYSIS INDEX: 19
HEMOLYSIS INDEX: 51
HGB BLD-MCNC: 14.8 GM/DL (ref 11.8–15.2)
INR PPP: 1.04 (ref 0.87–1.13)
LYMPHOCYTES # BLD AUTO: 1.8 K/MM3 (ref 1.2–5.4)
LYMPHOCYTES NFR BLD AUTO: 21.5 % (ref 13.4–35)
MCHC RBC AUTO-ENTMCNC: 33 % (ref 32–34)
MCV RBC AUTO: 85 FL (ref 84–94)
MONOCYTES # (AUTO): 0.8 K/MM3 (ref 0–0.8)
MONOCYTES % (AUTO): 9 % (ref 0–7.3)
PLATELET # BLD: 201 K/MM3 (ref 140–440)
RBC # BLD AUTO: 5.24 M/MM3 (ref 3.65–5.03)

## 2020-08-07 PROCEDURE — 71046 X-RAY EXAM CHEST 2 VIEWS: CPT

## 2020-08-07 PROCEDURE — 93005 ELECTROCARDIOGRAM TRACING: CPT

## 2020-08-07 PROCEDURE — G0480 DRUG TEST DEF 1-7 CLASSES: HCPCS

## 2020-08-07 PROCEDURE — 99284 EMERGENCY DEPT VISIT MOD MDM: CPT

## 2020-08-07 PROCEDURE — 80053 COMPREHEN METABOLIC PANEL: CPT

## 2020-08-07 PROCEDURE — 36415 COLL VENOUS BLD VENIPUNCTURE: CPT

## 2020-08-07 PROCEDURE — 96375 TX/PRO/DX INJ NEW DRUG ADDON: CPT

## 2020-08-07 PROCEDURE — 82550 ASSAY OF CK (CPK): CPT

## 2020-08-07 PROCEDURE — 80048 BASIC METABOLIC PNL TOTAL CA: CPT

## 2020-08-07 PROCEDURE — 83735 ASSAY OF MAGNESIUM: CPT

## 2020-08-07 PROCEDURE — 80320 DRUG SCREEN QUANTALCOHOLS: CPT

## 2020-08-07 PROCEDURE — 84484 ASSAY OF TROPONIN QUANT: CPT

## 2020-08-07 PROCEDURE — 85025 COMPLETE CBC W/AUTO DIFF WBC: CPT

## 2020-08-07 PROCEDURE — 85730 THROMBOPLASTIN TIME PARTIAL: CPT

## 2020-08-07 PROCEDURE — 96374 THER/PROPH/DIAG INJ IV PUSH: CPT

## 2020-08-07 PROCEDURE — 85610 PROTHROMBIN TIME: CPT

## 2020-08-07 NOTE — EMERGENCY DEPARTMENT REPORT
ED General Adult HPI





- General


Chief complaint: Chest Pain


Stated complaint: CHEST PAIN


PUI?: No


Time Seen by Provider: 20 13:52


Source: patient, RN notes reviewed, old records reviewed


Mode of arrival: Ambulatory


Limitations: No Limitations





- History of Present Illness


Initial comments: 





Patient is a 60-year-old gentleman, right-hand-dominant, whom I have evaluated 

in the past, with a longstanding past medical history of hypertension, diabetes,

chronic smoking and tobacco use.





I have evaluated this patient multiple times in the past for multiple 

complaints.





He works as a , and is right-hand dominant.





The patient had a cardiac catheterization performed at this hospital April of 

this year, which was negative for significant coronary artery disease.





He also has a chronic left upper lobe infiltrate/scar





Today, patient presents to the ER with a complaint of central and left-sided 

chest wall pain, that started while he was outside and working, painting, and 

heavy lifting.





He denies travel, surgeries, posterior leg pain and leg swelling, and DVT, 

pulmonary embolism risk factors.  The chest wall pain does not radiate to the 

back, arms or neck.  There is no vomiting or diaphoresis.  He has chronic babatunde

rtness of breath.





He states he did not consume alcohol recently, he reports that he is not 

homicidal or suicidal.  He does report that he is anxious, with diffuse muscular

cramps.








-: Gradual, hour(s)


Location: chest


Radiation: non-radiation


Quality: aching


Consistency: constant


Improves with: rest


Worsens with: movement





- Related Data


                                Home Medications











 Medication  Instructions  Recorded  Confirmed  Last Taken


 


Gabapentin 300 mg PO QDAY 05/15/20 05/16/20 Unknown


 


metFORMIN [Glucophage] 500 mg PO BID 05/15/20 05/16/20 Unknown








                                  Previous Rx's











 Medication  Instructions  Recorded  Last Taken  Type


 


Metoclopramide [Reglan TAB] 10 mg PO QID PRN #30 tab 19 Unknown Rx


 


Multivitamin with Folic Acid [Cvs 400 mcg PO QDAY #30 tablet 19 Unknown Rx





One Daily Essential Tablet]    


 


Esomeprazole Magnesium [Nexium 20 mg PO DAILY #30 tab 20 Unknown Rx





24Hr]    


 


AtorvaSTATin [Lipitor] 40 mg PO QHS #30 tablet 20 Unknown Rx


 


Metoprolol [Lopressor TAB] 50 mg PO BID #60 tablet 20 Unknown Rx


 


Multivitamin Tab [Multiple Vitamin 1 each PO QDAY  tablet 20 Unknown Rx





TAB (Theragran)]    


 


Ibuprofen [Motrin] 600 mg PO Q8H PRN #20 tablet 07/10/20 Unknown Rx


 


Ondansetron [Zofran Odt] 4 mg PO Q8HR PRN #15 tab.rapdis 07/10/20 Unknown Rx


 


Acetaminophen [Non-Aspirin Extra 500 mg PO Q6HR PRN #30 tablet 20 Unknown 

Rx





Strength]    


 


Famotidine [Pepcid] 20 mg PO BID #60 tablet 20 Unknown Rx


 


Ibuprofen [Motrin] 600 mg PO Q8H PRN #30 tablet 20 Unknown Rx


 


Multivitamin with Folic Acid [Cvs 400 mcg PO QDAY #30 tablet 20 Unknown Rx





One Daily Essential Tablet]    











                                    Allergies











Allergy/AdvReac Type Severity Reaction Status Date / Time


 


No Known Allergies Allergy   Verified 19 15:20














ED Review of Systems


ROS: 


Stated complaint: CHEST PAIN


Other details as noted in HPI





Constitutional: malaise.  denies: fever


Eyes: denies: eye discharge


ENT: denies: congestion


Respiratory: denies: wheezing


Cardiovascular: chest pain


Gastrointestinal: denies: abdominal pain, nausea, vomiting


Musculoskeletal: arthralgia, myalgia


Skin: denies: lesions


Neurological: headache


Psychiatric: anxiety.  denies: homicidal thoughts, suicidal thoughts





ED Past Medical Hx





- Past Medical History


Previous Medical History?: Yes


Hx Hypertension: Yes


Hx Diabetes: Yes


Hx GERD: Yes


Additional medical history: ETOH





- Surgical History


Past Surgical History?: Yes


Additional Surgical History: right arm surgery- to repair artery





- Social History


Smoking Status: Current Every Day Smoker


Substance Use Type: Alcohol





- Medications


Home Medications: 


                                Home Medications











 Medication  Instructions  Recorded  Confirmed  Last Taken  Type


 


Metoclopramide [Reglan TAB] 10 mg PO QID PRN #30 tab 19 Unknown 

Rx


 


Multivitamin with Folic Acid [Cvs 400 mcg PO QDAY #30 tablet 19 

Unknown Rx





One Daily Essential Tablet]     


 


Esomeprazole Magnesium [Nexium 20 mg PO DAILY #30 tab 20 Unknown 

Rx





24Hr]     


 


AtorvaSTATin [Lipitor] 40 mg PO QHS #30 tablet 20 Unknown Rx


 


Metoprolol [Lopressor TAB] 50 mg PO BID #60 tablet 20 Unknown Rx


 


Multivitamin Tab [Multiple Vitamin 1 each PO QDAY  tablet 20 

Unknown Rx





TAB (Theragran)]     


 


Gabapentin 300 mg PO QDAY 05/15/20 05/16/20 Unknown History


 


metFORMIN [Glucophage] 500 mg PO BID 05/15/20 05/16/20 Unknown History


 


Ibuprofen [Motrin] 600 mg PO Q8H PRN #20 tablet 07/10/20  Unknown Rx


 


Ondansetron [Zofran Odt] 4 mg PO Q8HR PRN #15 tab.rapdis 07/10/20  Unknown Rx


 


Acetaminophen [Non-Aspirin Extra 500 mg PO Q6HR PRN #30 tablet 20  Unknown

 Rx





Strength]     


 


Famotidine [Pepcid] 20 mg PO BID #60 tablet 20  Unknown Rx


 


Ibuprofen [Motrin] 600 mg PO Q8H PRN #30 tablet 20  Unknown Rx


 


Multivitamin with Folic Acid [Cvs 400 mcg PO QDAY #30 tablet 20  Unknown 

Rx





One Daily Essential Tablet]     














ED Physical Exam





- General


Limitations: No Limitations


General appearance: alert, anxious, in distress





- Head


Head exam: Present: atraumatic, normocephalic





- Eye


Eye exam: Present: normal appearance, EOMI.  Absent: nystagmus





- ENT


ENT exam: Present: normal exam, mucous membranes dry, normal external ear exam, 

other (Tongue fasciculations noted)





- Neck


Neck exam: Present: normal inspection, full ROM.  Absent: tenderness, 

meningismus





- Respiratory


Respiratory exam: Present: normal lung sounds bilaterally, chest wall tendern

ess.  Absent: respiratory distress





- Cardiovascular


Cardiovascular Exam: Present: normal rhythm, tachycardia, normal heart sounds.  

Absent: systolic murmur, diastolic murmur, rubs, gallop





- GI/Abdominal


GI/Abdominal exam: Present: soft, normal bowel sounds.  Absent: distended, 

tenderness, guarding, rebound, rigid, pulsatile mass





- Rectal


Rectal exam: Present: deferred





- Extremities Exam


Extremities exam: Present: normal inspection, full ROM, other (2+ pulses noted 

in the bilateral upper and lower extremities.  There is no palpable cord.   

negative Homans sign.  Muscular compartments are soft.  The pelvis is stable.). 

Absent: pedal edema, calf tenderness





- Back Exam


Back exam: Present: normal inspection, full ROM.  Absent: tenderness, CVA 

tenderness (R), CVA tenderness (L), paraspinal tenderness, vertebral tenderness





- Neurological Exam


Neurological exam: Present: alert, oriented X3, other (No facial droop.  Tongue 

midline.  Extraocular movements intact bilaterally.  Facial sensation intact to 

light touch in V1, V2, V3 distribution bilaterally.  5 and a 5 strength in 4 

extremities.  Sensation intact to light touch in 4 extremities.).  Absent: motor

sensory deficit





- Psychiatric


Psychiatric exam: Present: anxious





- Skin


Skin exam: Present: warm, dry, intact, normal color.  Absent: rash





ED Course


                                   Vital Signs











  20





  12:35 13:58 14:00


 


Temperature 97.9 F  


 


Pulse Rate 120 H  


 


Respiratory 24  





Rate   


 


Blood Pressure   150/88


 


Blood Pressure   





[Left]   


 


O2 Sat by Pulse 100 99 99





Oximetry   














  20





  14:15 14:31 14:37


 


Temperature   


 


Pulse Rate   110 H


 


Respiratory   16





Rate   


 


Blood Pressure 126/80 152/85 


 


Blood Pressure   117/75





[Left]   


 


O2 Sat by Pulse 100 100 96





Oximetry   














  20





  14:45 15:00 15:15


 


Temperature   


 


Pulse Rate   


 


Respiratory   





Rate   


 


Blood Pressure 106/73 111/71 107/67


 


Blood Pressure   





[Left]   


 


O2 Sat by Pulse 100  100





Oximetry   














  20





  15:30 15:45 16:00


 


Temperature   


 


Pulse Rate   


 


Respiratory   





Rate   


 


Blood Pressure 104/66 107/67 105/68


 


Blood Pressure   





[Left]   


 


O2 Sat by Pulse 98 99 100





Oximetry   














  20





  16:09 16:15 16:30


 


Temperature   


 


Pulse Rate 70  


 


Respiratory 16  





Rate   


 


Blood Pressure  106/70 106/68


 


Blood Pressure 105/68  





[Left]   


 


O2 Sat by Pulse 100 100 100





Oximetry   














  20





  16:45 17:00 17:16


 


Temperature   


 


Pulse Rate   


 


Respiratory   





Rate   


 


Blood Pressure 103/68 114/71 119/77


 


Blood Pressure   





[Left]   


 


O2 Sat by Pulse 100  100





Oximetry   














  20





  17:30 17:46 18:00


 


Temperature   


 


Pulse Rate   


 


Respiratory   





Rate   


 


Blood Pressure 122/79 124/75 120/72


 


Blood Pressure   





[Left]   


 


O2 Sat by Pulse 99 100 99





Oximetry   














  20





  18:06 18:16 18:30


 


Temperature   


 


Pulse Rate 84  


 


Respiratory 16  





Rate   


 


Blood Pressure  129/73 119/73


 


Blood Pressure 120/72  





[Left]   


 


O2 Sat by Pulse 96 100 100





Oximetry   














  20





  18:46 19:00 19:16


 


Temperature   


 


Pulse Rate   


 


Respiratory   





Rate   


 


Blood Pressure 114/73 119/73 124/74


 


Blood Pressure   





[Left]   


 


O2 Sat by Pulse 100  100





Oximetry   














  20





  19:30 19:46 20:00


 


Temperature   


 


Pulse Rate   


 


Respiratory   





Rate   


 


Blood Pressure 127/74 120/71 125/70


 


Blood Pressure   





[Left]   


 


O2 Sat by Pulse 100 100 100





Oximetry   














  20





  20:15 20:30 20:46


 


Temperature   


 


Pulse Rate   


 


Respiratory   





Rate   


 


Blood Pressure 122/70 117/63 137/75


 


Blood Pressure   





[Left]   


 


O2 Sat by Pulse 100 99 100





Oximetry   














  20





  21:00 21:16 21:30


 


Temperature   


 


Pulse Rate   


 


Respiratory   





Rate   


 


Blood Pressure 115/67 120/60 104/62


 


Blood Pressure   





[Left]   


 


O2 Sat by Pulse  98 97





Oximetry   














  20





  21:46 22:00 22:15


 


Temperature   


 


Pulse Rate   


 


Respiratory   





Rate   


 


Blood Pressure 101/66 100/65 115/67


 


Blood Pressure   





[Left]   


 


O2 Sat by Pulse 98 97 99





Oximetry   














  20





  22:30 22:33


 


Temperature  


 


Pulse Rate  83


 


Respiratory  





Rate  


 


Blood Pressure 100/60 


 


Blood Pressure  





[Left]  


 


O2 Sat by Pulse 98 





Oximetry  














- Reevaluation(s)


Reevaluation #1: 





20 14:57


Differential diagnosis, include but not limited to: Costochondritis, alcohol 

dependence, alcohol withdrawal, electrolyte derangement/hypovolemic hyponatremia








Assessment and plan: 60-year-old gentleman with left-sided chest wall pain, 

which is reproducible, had an unremarkable cardiac catheterization at this 

hospital 4 months ago, who does not have DVT or pulmonary embolism risk factors,

 low risk by Wells criteria, who occasionally presents to this hospital 

intoxicated and/or in withdrawal.





Check EKG x1, troponin x2, screening laboratory studies.  Patient found to have 

evidence of dehydration via dry mucous membranes, and mild hyponatremia, suspect

 that this is likely multifactorial, likely hypovolemic hyponatremia, likely 

secondary to chronic alcoholism, and perhaps working in the hot outdoors.





He will be given fluids, ketorolac, Valium, repeat laboratory studies pending.





Repeat troponin pending.





X-ray of the chest unchanged from prior.





We will reassess after initial therapies have been initiated.








Reevaluation #2: 





20 15:21


Patient sleeping comfortably at this time, and in no acute distress.





Heart rate now 80 bpm.  Blood pressure within normal limits.








Reevaluation #3: 





20 15:41


Care will be transferred to the oncoming ER physician, Dr. SOHAN Salazar, for final

 reevaluation, to follow-up repeat laboratory studies and final vital signs.  

Anticipated discharge with supportive care





ED Medical Decision Making





- Lab Data


Result diagrams: 


                                 20 13:12





                                 20 14:26








                                   Vital Signs











  20





  12:35


 


Temperature 97.9 F


 


Pulse Rate 120 H


 


Respiratory 24





Rate 


 


O2 Sat by Pulse 100





Oximetry 











                                   Lab Results











  20 Range/Units





  13:12 13:12 


 


WBC  8.5   (4.5-11.0)  K/mm3


 


RBC  5.24 H   (3.65-5.03)  M/mm3


 


Hgb  14.8   (11.8-15.2)  gm/dl


 


Hct  44.8   (35.5-45.6)  %


 


MCV  85   (84-94)  fl


 


MCH  28   (28-32)  pg


 


MCHC  33   (32-34)  %


 


RDW  15.3 H   (13.2-15.2)  %


 


Plt Count  201   (140-440)  K/mm3


 


Lymph % (Auto)  21.5   (13.4-35.0)  %


 


Mono % (Auto)  9.0 H   (0.0-7.3)  %


 


Eos % (Auto)  0.3   (0.0-4.3)  %


 


Baso % (Auto)  1.0   (0.0-1.8)  %


 


Lymph #  1.8   (1.2-5.4)  K/mm3


 


Mono #  0.8   (0.0-0.8)  K/mm3


 


Eos #  0.0   (0.0-0.4)  K/mm3


 


Baso #  0.1   (0.0-0.1)  K/mm3


 


Seg Neutrophils %  68.2   (40.0-70.0)  %


 


Seg Neutrophils #  5.8   (1.8-7.7)  K/mm3


 


Sodium   129 L  (137-145)  mmol/L


 


Potassium   4.0  (3.6-5.0)  mmol/L


 


Chloride   90.5 L  ()  mmol/L


 


Carbon Dioxide   20 L  (22-30)  mmol/L


 


Anion Gap   23  mmol/L


 


BUN   24 H  (9-20)  mg/dL


 


Creatinine   1.2  (0.8-1.3)  mg/dL


 


Estimated GFR   > 60  ml/min


 


BUN/Creatinine Ratio   20  %


 


Glucose   202 H  ()  mg/dL


 


Calcium   10.1  (8.4-10.2)  mg/dL


 


Total Bilirubin   1.10  (0.1-1.2)  mg/dL


 


AST   45 H  (5-40)  units/L


 


ALT   34  (7-56)  units/L


 


Alkaline Phosphatase   95  ()  units/L


 


Troponin T   < 0.010  (0.00-0.029)  ng/mL


 


Total Protein   8.4 H  (6.3-8.2)  g/dL


 


Albumin   4.7  (3.9-5)  g/dL


 


Albumin/Globulin Ratio   1.3  %














- EKG Data


-: EKG Interpreted by Me


EKG shows normal: sinus rhythm


Rate: tachycardia





- EKG Data


When compared to previous EKG there are: no significant change





20 14:57


EKG today shows sinus tachycardia, 110 bpm, normal axis,  ms, left 

ventricular hypertrophy, motion artifact, abnormal EKG, not a STEMI, unchanged 

from prior EKG











- Radiology Data


Radiology results: report reviewed, image reviewed





Print Report











Referring Physician: JEMMA MARSHALL 


 


Patient Name: JANAK NESS 


 


Patient ID: [id number] 


 


YOB: 1960 


 


Sex: Male 


 


Accession: [id number] 


 


Report Date: 2020 


 


Report Status: Finalized 


 


Findings


[facility] [address] [place] [address] 

XRay Report Signed Patient: JANAK NESS MR#:  [id number] : 1960 

Acct:[id number] Age/Sex: 60 / M ADM Date: 20 Loc: ED Attending Dr: 

Ordering Physician: NICOLE ROSSI Date of Service: 20 Procedure(s): 

XR chest routine 2V Accession Number(s): [id number] cc: NICOLE ROSSI Fluoro 

Time In Minutes: CHEST 2 VIEWS INDICATION: Chest Pain. COMPARISON: 7/10/2020 

FINDINGS: Support devices: None. Heart: Within normal limits. Lungs/pleura: 

Stable pleural-parenchymal disease at the left lung apex. The remainder the 

lungs are clear. No pneumothorax. Additional findings: None. IMPRESSION: No 

change since 7/10/2020. Probable chronic scarring in the left upper lobe. Signer

 Name: Daniel Frias Jr, MD Signed: 2020 1:06 PM Workstation Name: 

[id number] Transcribed By: TTR Dictated By: DANIEL FRIAS JR, MD Electron

ically Authenticated By: DANIEL FRIAS JR, MD Signed Date/Time: 20 

1306 DD/DT: 20 1305 TD/TT:   














Report











Referring Physician: SHANI COX 


 


Patient Name: JANAK NESS 


 


Patient ID: [id number] 


 


YOB: 1960 


 


Sex: Male 


 


Accession: [id number] 


 


Report Date: 2020-07-10 


 


Report Status: Finalized 


 


Findings


[facility] [address] [place] [address] 

XRay Report Signed Patient: JANAK NESS MR#:  [id number] : 1960 

Acct:[id number] Age/Sex: 60 / M ADM Date: 07/10/20 Loc: ED Attending Dr: 

Ordering Physician: SHANI COX MD Date of Service: 07/10/20 Procedure(s): XR chest 

1V ap Accession Number(s): [id number] cc: SHANI COX MD Fluoro Time In Minutes: CHEST 

1 VIEW 7/10/2020 9:47 AM INDICATION / CLINICAL INFORMATION: Chest Pain. 

COMPARISON: 5/15/2020 FINDINGS: SUPPORT DEVICES: None. HEART / MEDIASTINUM: No 

significant abnormality. LUNGS / PLEURA: Stable pleural-parenchymal scarring in 

the left upper lobe. No pneumothorax. ADDITIONAL FINDINGS: No significant 

additional findings. IMPRESSION: 1. No adverse change from the prior exam. 

Signer Name: Khoi Thurman MD Signed: 7/10/2020 10:49 AM Workstation Name: 

[id number] Transcribed By: JC Dictated By: Khoi Thurman MD 

Electronically Authenticated By: Khoi Thurman MD Signed Date/Time: 07/10/20 

1049 DD/DT: 07/10/20 1049   











Critical care attestation.: 


If time is entered above; I have spent that time in minutes in the direct care 

of this critically ill patient, excluding procedure time.








ED Disposition


Clinical Impression: 


 Costochondritis, Alcohol dependence, Dehydration





Disposition: DC-01 TO HOME OR SELFCARE


Is pt being admited?: No


Does the pt Need Aspirin: No


Condition: Stable


Additional Instructions: 


Rest, avoid heavy lifting, and avoid strenuous physical activities.  Take the 

medications as needed and/or directed.  Minimize consumption of alcohol.  

Minimize consumption of tobacco and cigarettes.  Avoid consumption of heavy 

and/or spicy foods.





When working outside in the extreme heat, make sure to work in shaded areas, 

take frequent breaks, and drink plenty of water/fluids.





Please follow-up with your primary care doctor within the next week.





Please return to the emergency room right away with new pain, worsening pain, 

migration of pain, projectile vomiting, change in mental status, confusion, 

inability to tolerate liquid feeds, new, worsened or different symptoms not 

present on the initial emergency room evaluation


Prescriptions: 


Multivitamin with Folic Acid [Cvs One Daily Essential Tablet] 400 mcg PO QDAY 

#30 tablet


Ibuprofen [Motrin] 600 mg PO Q8H PRN #30 tablet


 PRN Reason: Pain


Acetaminophen [Non-Aspirin Extra Strength] 500 mg PO Q6HR PRN #30 tablet


 PRN Reason: Pain , Severe (7-10)


Famotidine [Pepcid] 20 mg PO BID #60 tablet


Referrals: 


LINNETTE TOLLIVER MD [Staff Physician] - 3-5 Days


[facility] [Provider Group] - 3-5 Days

## 2020-08-07 NOTE — XRAY REPORT
CHEST 2 VIEWS 



INDICATION:  Chest Pain.



COMPARISON:  7/10/2020



FINDINGS:

Support devices: None.



Heart: Within normal limits. 

Lungs/pleura: Stable pleural-parenchymal disease at the left lung apex. The remainder the lungs are c
lear.  No pneumothorax.



Additional findings: None.



IMPRESSION:

No change since 7/10/2020. Probable chronic scarring in the left upper lobe.



Signer Name: Daniel Frias Jr, MD 

Signed: 8/7/2020 1:06 PM

Workstation Name: [id number]

## 2020-12-24 ENCOUNTER — HOSPITAL ENCOUNTER (EMERGENCY)
Dept: HOSPITAL 5 - ED | Age: 60
LOS: 1 days | Discharge: HOME | End: 2020-12-25
Payer: SELF-PAY

## 2020-12-24 DIAGNOSIS — S09.90XA: ICD-10-CM
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
## 2020-12-24 DIAGNOSIS — E11.9: ICD-10-CM
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
## 2020-12-24 DIAGNOSIS — M54.2: ICD-10-CM

## 2020-12-24 DIAGNOSIS — Z79.899: ICD-10-CM

## 2020-12-24 DIAGNOSIS — S80.812A: Primary | ICD-10-CM

## 2020-12-24 DIAGNOSIS — R07.89: ICD-10-CM
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
## 2020-12-24 DIAGNOSIS — F10.129: ICD-10-CM

## 2020-12-24 DIAGNOSIS — I10: ICD-10-CM

## 2020-12-24 DIAGNOSIS — K21.9: ICD-10-CM

## 2020-12-24 DIAGNOSIS — F17.200: ICD-10-CM

## 2020-12-24 DIAGNOSIS — Z98.890: ICD-10-CM

## 2020-12-24 PROCEDURE — G0480 DRUG TEST DEF 1-7 CLASSES: HCPCS

## 2020-12-24 PROCEDURE — 72040 X-RAY EXAM NECK SPINE 2-3 VW: CPT

## 2020-12-24 PROCEDURE — 80048 BASIC METABOLIC PNL TOTAL CA: CPT

## 2020-12-24 PROCEDURE — 82550 ASSAY OF CK (CPK): CPT

## 2020-12-24 PROCEDURE — 36415 COLL VENOUS BLD VENIPUNCTURE: CPT

## 2020-12-24 PROCEDURE — 71045 X-RAY EXAM CHEST 1 VIEW: CPT

## 2020-12-24 PROCEDURE — 99285 EMERGENCY DEPT VISIT HI MDM: CPT

## 2020-12-24 PROCEDURE — 80320 DRUG SCREEN QUANTALCOHOLS: CPT

## 2020-12-24 PROCEDURE — 93005 ELECTROCARDIOGRAM TRACING: CPT

## 2020-12-24 PROCEDURE — 83735 ASSAY OF MAGNESIUM: CPT

## 2020-12-24 PROCEDURE — 70450 CT HEAD/BRAIN W/O DYE: CPT

## 2020-12-24 PROCEDURE — 85027 COMPLETE CBC AUTOMATED: CPT

## 2020-12-24 PROCEDURE — 96374 THER/PROPH/DIAG INJ IV PUSH: CPT

## 2020-12-24 PROCEDURE — 84484 ASSAY OF TROPONIN QUANT: CPT

## 2020-12-24 PROCEDURE — 72125 CT NECK SPINE W/O DYE: CPT

## 2020-12-24 PROCEDURE — 73562 X-RAY EXAM OF KNEE 3: CPT

## 2020-12-24 NOTE — EMERGENCY DEPARTMENT REPORT
ED General Adult HPI





- General


Chief complaint: Fall


Stated complaint: ETOH ABUSE


PUI?: No


Time Seen by Provider: 20 23:33


Source: patient, EMS ( EMS documentation not available at time of chart 

dictation ), RN notes reviewed, old records reviewed


Mode of arrival: Stretcher


Limitations: Other (Intoxication)





- History of Present Illness


Initial comments: 





The patient was evaluated in the emergency department for symptoms described in 

the history of present illness.  He/she was evaluated in the context of the 

global COVID-19 pandemic, which necessitated consideration that the patient 

might be at risk for infection with the virus that causes COVID-19.  

Institutional protocols and algorithms that pertain to the evaluation of 

patients at risk for COVID-19 are in a state of rapid change based on 

information released by regulatory bodies including the CDC and federal and 

state organizations.  These policies and algorithms were followed during the 

patient's care in the emergency department.  Please note that these policies, 

procedures and recommendations changed on a rapid basis.





This is a 60-year-old gentleman.  I am familiar with this patient.  He is a 

frequent utilizer of this emergency room, typically secondary to alcohol 

intoxication.





He presents to the ER today with a complaint of fall, alcohol intoxication, and 

pain in his left neck, left arm, and left leg.  He is not sure if he is having a

headache.  He is not accompanied by friends or family for additional information

or collateral information.  He denies extremity weakness.  He is not homicidal 

or suicidal.  He also complains of chest tightness," there is something wrong 

with me."  He also states that he fell a few days ago, and injured his right 

anterior tibia.  He believes that he is up-to-date with tetanus vaccinations.


-: hour(s), days(s)


Location: neck, left, lower extremity


Radiation: other (Patient not sure if he is having radiation)


Quality: aching


Consistency: intermittent


Improves with: other (Patient cannot describe exacerbating or relieving factors)





- Related Data


                                Home Medications











 Medication  Instructions  Recorded  Confirmed  Last Taken


 


Gabapentin 300 mg PO QDAY 05/15/20 05/16/20 Unknown


 


metFORMIN [Glucophage] 500 mg PO BID 05/15/20 05/16/20 Unknown








                                  Previous Rx's











 Medication  Instructions  Recorded  Last Taken  Type


 


Metoclopramide [Reglan TAB] 10 mg PO QID PRN #30 tab 19 Unknown Rx


 


Multivitamin with Folic Acid [Cvs 400 mcg PO QDAY #30 tablet 19 Unknown Rx





One Daily Essential Tablet]    


 


Esomeprazole Magnesium [Nexium 20 mg PO DAILY #30 tab 20 Unknown Rx





24Hr]    


 


AtorvaSTATin [Lipitor] 40 mg PO QHS #30 tablet 20 Unknown Rx


 


Metoprolol [Lopressor TAB] 50 mg PO BID #60 tablet 20 Unknown Rx


 


Multivitamin Tab [Multiple Vitamin 1 each PO QDAY  tablet 20 Unknown Rx





TAB (Theragran)]    


 


Ibuprofen [Motrin] 600 mg PO Q8H PRN #20 tablet 07/10/20 Unknown Rx


 


Ondansetron [Zofran Odt] 4 mg PO Q8HR PRN #15 tab.rapdis 07/10/20 Unknown Rx


 


Acetaminophen [Non-Aspirin Extra 500 mg PO Q6HR PRN #30 tablet 20 Unknown 

Rx





Strength]    


 


Famotidine [Pepcid] 20 mg PO BID #60 tablet 20 Unknown Rx


 


Ibuprofen [Motrin] 600 mg PO Q8H PRN #30 tablet 20 Unknown Rx


 


Multivitamin with Folic Acid [Cvs 400 mcg PO QDAY #30 tablet 20 Unknown Rx





One Daily Essential Tablet]    


 


Multivitamin with Folic Acid [Cvs 400 mcg PO QDAY #30 tablet 20 Unknown Rx





One Daily Essential Tablet]    











                                    Allergies











Allergy/AdvReac Type Severity Reaction Status Date / Time


 


No Known Allergies Allergy   Verified 19 15:20














ED Review of Systems


ROS: 


Stated complaint: ETOH ABUSE


Other details as noted in HPI





Comment: Unobtainable due to pts medical conditions (Patient is intoxicated)


Constitutional: denies: fever


Cardiovascular: chest pain


Gastrointestinal: abdominal pain


Musculoskeletal: arthralgia, myalgia


Psychiatric: denies: homicidal thoughts, suicidal thoughts





ED Past Medical Hx





- Past Medical History


Previous Medical History?: Yes


Hx Hypertension: Yes


Hx Diabetes: Yes


Hx GERD: Yes


Additional medical history: ETOH





- Surgical History


Past Surgical History?: Yes


Additional Surgical History: right arm surgery- to repair artery.  Left knee 

hard ware.





- Social History


Smoking Status: Current Every Day Smoker


Substance Use Type: Alcohol





- Medications


Home Medications: 


                                Home Medications











 Medication  Instructions  Recorded  Confirmed  Last Taken  Type


 


Metoclopramide [Reglan TAB] 10 mg PO QID PRN #30 tab 19 Unknown 

Rx


 


Multivitamin with Folic Acid [Cvs 400 mcg PO QDAY #30 tablet 19 

Unknown Rx





One Daily Essential Tablet]     


 


Esomeprazole Magnesium [Nexium 20 mg PO DAILY #30 tab 20 Unknown 

Rx





24Hr]     


 


AtorvaSTATin [Lipitor] 40 mg PO QHS #30 tablet 20 Unknown Rx


 


Metoprolol [Lopressor TAB] 50 mg PO BID #60 tablet 20 Unknown Rx


 


Multivitamin Tab [Multiple Vitamin 1 each PO QDAY  tablet 20 

Unknown Rx





TAB (Theragran)]     


 


Gabapentin 300 mg PO QDAY 05/15/20 05/16/20 Unknown History


 


metFORMIN [Glucophage] 500 mg PO BID 05/15/20 05/16/20 Unknown History


 


Ibuprofen [Motrin] 600 mg PO Q8H PRN #20 tablet 07/10/20  Unknown Rx


 


Ondansetron [Zofran Odt] 4 mg PO Q8HR PRN #15 tab.rapdis 07/10/20  Unknown Rx


 


Acetaminophen [Non-Aspirin Extra 500 mg PO Q6HR PRN #30 tablet 20  Unknown

 Rx





Strength]     


 


Famotidine [Pepcid] 20 mg PO BID #60 tablet 20  Unknown Rx


 


Ibuprofen [Motrin] 600 mg PO Q8H PRN #30 tablet 20  Unknown Rx


 


Multivitamin with Folic Acid [Cvs 400 mcg PO QDAY #30 tablet 20  Unknown 

Rx





One Daily Essential Tablet]     


 


Multivitamin with Folic Acid [Cvs 400 mcg PO QDAY #30 tablet 20  Unknown 

Rx





One Daily Essential Tablet]     














ED Physical Exam





- General


General appearance: appears intoxicated





- Head


Head exam: Present: atraumatic, normocephalic





- Eye


Eye exam: Present: normal appearance (Chronic appearing cataract noted on the 

right eye), EOMI.  Absent: nystagmus





- ENT


ENT exam: Present: normal exam, mucous membranes dry, normal external ear exam, 

other (Tongue fasciculations noted.)





- Neck


Neck exam: Present: normal inspection, tenderness (There is paracervical 

tenderness.  There is no midline cervical spine tenderness), full ROM





- Respiratory


Respiratory exam: Present: normal lung sounds bilaterally.  Absent: respiratory 

distress, wheezes, rales, rhonchi, stridor, decreased breath sounds





- Cardiovascular


Cardiovascular Exam: Present: normal rhythm, tachycardia, normal heart sounds.  

Absent: systolic murmur, diastolic murmur, rubs, gallop





- GI/Abdominal


GI/Abdominal exam: Present: soft.  Absent: distended, tenderness, guarding, 

rebound, rigid, pulsatile mass





- Rectal


Rectal exam: Present: deferred





- Extremities Exam


Extremities exam: Present: full ROM, other (2+ pulses noted in the bilateral 

upper and lower extremities.  There is no palpable cord.   negative Homans sign.

 Muscular compartments are soft.  The pelvis is stable.).  Absent: normal 

inspection (Abrasion noted to right lower extremity.), calf tenderness





- Back Exam


Back exam: Present: normal inspection.  Absent: tenderness, CVA tenderness (R), 

CVA tenderness (L), paraspinal tenderness, vertebral tenderness





- Neurological Exam


Neurological exam: Present: altered (Patient alert to name.  Patient is 

intoxicated), normal gait, other (No facial droop.  Tongue midline.  Extraocular

movements intact bilaterally.  Facial sensation intact to light touch in V1, V2,

V3 distribution bilaterally.  5 and a 5 strength in 4 extremities.  Sensation 

intact to light touch in 4 extremities.)





- Psychiatric


Psychiatric exam: Present: anxious.  Absent: homicidal ideation, suicidal 

ideation





- Skin


Skin exam: Present: warm, abrasion.  Absent: rash





ED Course


                                   Vital Signs











  20





  19:57 02:14


 


Temperature 97.7 F 97.6 F


 


Pulse Rate 109 H 89


 


Respiratory 18 20





Rate  


 


Blood Pressure 177/103 


 


Blood Pressure  130/80





[Left]  


 


O2 Sat by Pulse 99 100





Oximetry  














- Reevaluation(s)


Reevaluation #1: 





20 03:34


Reassessed.  Awake, alert, oriented, clinically sober, and walking with a steady

 gait.  This patient is a chronic and recurrent/frequent alcoholic, and he is 

clinically sober at this time.











ED Medical Decision Making





- Lab Data


Result diagrams: 


                                 20 00:18





                                 20 00:18








                                   Vital Signs











  20





  19:57


 


Temperature 97.7 F


 


Pulse Rate 109 H


 


Respiratory 18





Rate 


 


Blood Pressure 177/103


 


O2 Sat by Pulse 99





Oximetry 











                                   Lab Results











  12/25/20 12/25/20 12/25/20 Range/Units





  00:18 00:18 00:18 


 


WBC  6.7    (4.5-11.0)  K/mm3


 


RBC  4.94    (3.65-5.03)  M/mm3


 


Hgb  14.0    (11.8-15.2)  gm/dl


 


Hct  42.8    (35.5-45.6)  %


 


MCV  87    (84-94)  fl


 


MCH  28    (28-32)  pg


 


MCHC  33    (32-34)  %


 


RDW  15.4 H    (13.2-15.2)  %


 


Plt Count  220    (140-440)  K/mm3


 


Sodium   143   (137-145)  mmol/L


 


Potassium   4.2   (3.6-5.0)  mmol/L


 


Chloride   106.5   ()  mmol/L


 


Carbon Dioxide   19 L   (22-30)  mmol/L


 


Anion Gap   22   mmol/L


 


BUN   10   (9-20)  mg/dL


 


Creatinine   0.7 L   (0.8-1.3)  mg/dL


 


Estimated GFR   > 60   ml/min


 


BUN/Creatinine Ratio   14   %


 


Glucose   146 H   ()  mg/dL


 


Calcium   9.1   (8.4-10.2)  mg/dL


 


Magnesium    2.20  (1.7-2.3)  mg/dL


 


Total Creatine Kinase    312 H  ()  units/L


 


Troponin T   < 0.010   (0.00-0.029)  ng/mL


 


Salicylates     (2.8-20.0)  mg/dL


 


Acetaminophen     (10.0-30.0)  ug/mL


 


Plasma/Serum Alcohol     (0-0.07)  %














  20 Range/Units





  00:18 00:18 00:18 


 


WBC     (4.5-11.0)  K/mm3


 


RBC     (3.65-5.03)  M/mm3


 


Hgb     (11.8-15.2)  gm/dl


 


Hct     (35.5-45.6)  %


 


MCV     (84-94)  fl


 


MCH     (28-32)  pg


 


MCHC     (32-34)  %


 


RDW     (13.2-15.2)  %


 


Plt Count     (140-440)  K/mm3


 


Sodium     (137-145)  mmol/L


 


Potassium     (3.6-5.0)  mmol/L


 


Chloride     ()  mmol/L


 


Carbon Dioxide     (22-30)  mmol/L


 


Anion Gap     mmol/L


 


BUN     (9-20)  mg/dL


 


Creatinine     (0.8-1.3)  mg/dL


 


Estimated GFR     ml/min


 


BUN/Creatinine Ratio     %


 


Glucose     ()  mg/dL


 


Calcium     (8.4-10.2)  mg/dL


 


Magnesium     (1.7-2.3)  mg/dL


 


Total Creatine Kinase     ()  units/L


 


Troponin T     (0.00-0.029)  ng/mL


 


Salicylates  < 0.3 L    (2.8-20.0)  mg/dL


 


Acetaminophen   5.0 L   (10.0-30.0)  ug/mL


 


Plasma/Serum Alcohol    0.29 H  (0-0.07)  %














- EKG Data


-: EKG Interpreted by Me


EKG shows normal: sinus rhythm


Rate: normal





- EKG Data





20 01:50


Sinus rhythm, 86 bpm, normal axis, left ventricular hypertrophy, poor R wave 

progression, and motion artifact.  Abnormal EKG.  Not a STEMI.


20 01:51


Unchanged from prior EKG from





2020





- Radiology Data


Radiology results: report reviewed, image reviewed





CT cervical spine spine without contrast  INDICATION: Fall today with genera

lized neck pain.  TECHNIQUE: Axial imaging performed through the cervical spine 

without the use of contrast. Sagittal and coronal reconstructed images were also

 reviewed. All CT scans at this location are performed using CT dose reduction 

for ALARA by means of automated exposure control.  COMPARISON: None  FINDINGS:  

Alignment: Spinal alignment is normal. Bones: There is no acute osseous 

abnormality. Mild to moderate multilevel discogenic DJD and mild multilevel 

facet arthropathy, with large anterior osteophytes. Soft tissues: No acute or 

significant incidental soft tissue abnormality.  IMPRESSION: No acute 

abnormality.  Signer Name: Wei Peters MD Signed: 2020 11:42 PM 

Workstation Name: [id number]





CT head without contrast  HISTORY: todd etoh dependence, closed head injury.  T

ECHNIQUE: Axial imaging performed from the skull apex through the skull base 

without the use of contrast. All CT scans at this location are performed using 

CT dose reduction for ALARA by means of automated exposure control.  COMPARISON:

 CT head from 2019  FINDINGS:  Parenchyma: No acute intracranial hemorrhage

 or parenchymal abnormality. Periventricular hypodensities are likely in keeping

 with microangiopathy. Ventricles: There is mild diffuse brain atrophy with 

commensurate ventricular enlargement which is likely age appropriate. Soft 

tissues: Soft tissues including the orbits appear normal. Bones: No acute 

osseous abnormality. Sinuses: Sinuses and mastoid air cells are clear.   IMPRES

UZMA: No acute abnormality.  Signer Name: Wei Peters MD Signed: 2020 

11:40 PM Workstation Name: [id number]





CHEST 1 VIEW  INDICATION: chest pain tachycardias.  COMPARISON: 2020  FIND

INGS:  SUPPORT DEVICES: None.  HEART: Within normal limits.  LUNGS/PLEURA: 

Chronic left apical scarring/pleural thickening and streaky airspace opacity. 

Otherwise clear lungs.  ADDITIONAL FINDINGS: None.  IMPRESSION: 1. No acute 

findings.  Signer Name: Wei Peters MD Signed: 2020 11:38 PM 

Workstation Name: [id number]





Print Report











Referring Physician: KELY MCCONNELL 


 


Patient Name: JANAK NESS 


 


Patient ID: [id number] 


 


YOB: 1960 


 


Sex: Male 


 


Accession: [id number] 


 


Report Date: 2020 


 


Report Status: Finalized 


 


Findings


[facility] [address] [place] [address] 

XRay Report Signed Patient: JANAK NESS MR#:  [id number] : 1960 

Acct:[id number] Age/Sex: 60 / M ADM Date: 20 Loc: ED Attending Dr: 

Ordering Physician: KELY MCCONNELL Date of Service: 20 Procedure(s): XR 

knee 3V LT Accession Number(s): [id number] cc: KELY MCCONNELL Fluoro Time In 

Minutes: LEFT KNEE 3 VIEWS INDICATION / CLINICAL INFORMATION: Fall with left 

knee pain. COMPARISON: None available. FINDINGS: BONES / JOINT(S): A lateral 

metallic plate and screws transfix the proximal tibia. There are 

mild-to-moderate tricompartmental degenerative changes. There are hypertrophic 

changes involving the proximal tibiofibular joint. I see no evidence of acute 

fracture, subluxation or significant joint effusion. SOFT TISSUES: No 

significant abnormality. ADDITIONAL FINDINGS: None. Signer Name: Arturo Ferreira MD Signed: 2020 8:44 PM Workstation Name: [id number] Transcribed By: RT 

Dictated By: Arturo Ferreira MD Electronically Authenticated By: Arturo Ferreira MD Signed Date/Time: 20 DD/DT: 20 TD/TT:   














- Medical Decision Making





Differential diagnosis, including but not limited to: Radiculopathy, alcohol 

intoxication, closed head injury, cervical spine injury, electrolyte 

derangement, costochondritis, aspiration, pneumonitis








Assessment and plan: 60-year-old gentleman, who is clinically intoxicated, but 

walks with a steady gait.  He states that he fell.  Given that he is intoxicated

 with history of trauma, CT scan of the brain and cervical spine are obtained, 

as per typical ATLS recommendations, no acute traumatic injuries noted.





EKG unchanged from prior, laboratory studies fairly unremarkable, with the 

exception of elevated blood alcohol level, as expected.  I find the patient to 

be low risk for major adverse cardiac event as per heart score.  His tachycardia

 has resolved, heart rate currently in the 80s, and he is not tachypneic or 

hypoxic.  An x-ray of the chest was unremarkable.  X-ray of the cervical spine 

was obtained prior to my personal evaluation of this patient, as was an x-ray of

 his lower extremity, which was also unremarkable.





It appears that the patient's main issue here is alcohol intoxication.  He does 

not have a sober adult who can come by and pick him up.  Patient will therefore 

be observed in this emergency room pending clinical sobriety, discharge when 

clinically sober


Critical care attestation.: 


If time is entered above; I have spent that time in minutes in the direct care 

of this critically ill patient, excluding procedure time.








ED Disposition


Clinical Impression: 


 History of fall, Neck pain, History of chest pain, Abrasion, right lower leg, 

initial encounter





Closed head injury


Qualifiers:


 Encounter type: initial encounter Qualified Code(s): S09.90XA - Unspecified 

injury of head, initial encounter





Alcohol intoxication


Qualifiers:


 Complication of substance-induced condition: uncomplicated Qualified Code(s): 

F10.920 - Alcohol use, unspecified with intoxication, uncomplicated





Left knee pain


Qualifiers:


 Chronicity: unspecified Qualified Code(s): M25.562 - Pain in left knee





Disposition: DC-01 TO HOME OR SELFCARE


Is pt being admited?: No


Does the pt Need Aspirin: No


Condition: Good


Additional Instructions: 


We recommend that the patient stop drinking alcohol.  Take a multivitamin as d

irected.  Continue current outpatient medications.  Do not drive or operate 

motor vehicles for the next 6 months, until cleared to do so by a primary care 

doctor.  Follow-up with a primary care doctor within the next week.  Patient may

 take over-the-counter Tylenol and ibuprofen as needed for pain.  Patient may 

wash abrasions with gentle soap and water.  Rest, avoid heavy lifting, and avoid

 strenuous physical activity.  Please return to the emergency room right away 

with new pain, worsened pain, migration of pain, projectile vomiting, change in 

mental status, confusion, inability to tolerate liquid feeds, new, worsened or 

different symptoms not present on the initial emergency room evaluation.


Prescriptions: 


Multivitamin with Folic Acid [Cvs One Daily Essential Tablet] 400 mcg PO QDAY 

#30 tablet


Referrals: 


[facility] [Provider Group] - 3-5 Days

## 2020-12-24 NOTE — XRAY REPORT
LEFT KNEE 3 VIEWS



INDICATION / CLINICAL INFORMATION:

Fall with left knee pain.



COMPARISON:

None available.

 

FINDINGS:



BONES / JOINT(S): A lateral metallic plate and screws transfix the proximal tibia. There are mild-to-
moderate tricompartmental degenerative changes. There are hypertrophic changes involving the proximal
 tibiofibular joint. I see no evidence of acute fracture, subluxation or significant joint effusion.

SOFT TISSUES: No significant abnormality.



ADDITIONAL FINDINGS: None.







Signer Name: Arturo Ferreira MD 

Signed: 12/24/2020 8:44 PM

Workstation Name: [id number]

## 2020-12-24 NOTE — XRAY REPORT
CERVICAL SPINE 3 VIEWS



INDICATION / CLINICAL INFORMATION:

Fall with neck pain.



COMPARISON:

None available.

 

FINDINGS:



BONES / JOINT(S): There is moderate generalized cervical spondylosis. There is nonspecific straighten
ing of the normal cervical lordosis. I see no evidence of acute fracture or subluxation.

SOFT TISSUES: The prevertebral soft tissues are normal.



ADDITIONAL FINDINGS: Chronic pleuroparenchymal changes in the left upper lobe have not changed since 
a chest radiograph on 08/07/20.







Signer Name: Arturo Ferreira MD 

Signed: 12/24/2020 8:46 PM

Workstation Name: [id number]

## 2020-12-24 NOTE — EVENT NOTE
ED Screening Note


Date of service: 12/24/20


Time: 20:12


ED Screening Note: 





Pain after fall x 2 weeks 


patient intoxicated 





This initial assessment/diagnostic orders/clinical plan/treatment(s) is/are 

subject to change based on patients health status, clinical progression and re-

assessment by fellow clinical providers in the ED. Further treatment and workup 

at subsequent clinical providers discretion. Patient/guardian urged not to elope

from the ED as their condition may be serious if not clinically assessed and 

managed. 





Initial orders include: 


xr

## 2020-12-25 VITALS — SYSTOLIC BLOOD PRESSURE: 130 MMHG | DIASTOLIC BLOOD PRESSURE: 80 MMHG

## 2020-12-25 LAB
BUN SERPL-MCNC: 10 MG/DL (ref 9–20)
BUN/CREAT SERPL: 14 %
CALCIUM SERPL-MCNC: 9.1 MG/DL (ref 8.4–10.2)
HCT VFR BLD CALC: 42.8 % (ref 35.5–45.6)
HEMOLYSIS INDEX: 12
HGB BLD-MCNC: 14 GM/DL (ref 11.8–15.2)
MCHC RBC AUTO-ENTMCNC: 33 % (ref 32–34)
MCV RBC AUTO: 87 FL (ref 84–94)
PLATELET # BLD: 220 K/MM3 (ref 140–440)
RBC # BLD AUTO: 4.94 M/MM3 (ref 3.65–5.03)

## 2020-12-25 NOTE — CAT SCAN REPORT
CT cervical spine spine without contrast



INDICATION:  Fall today with generalized neck pain.



TECHNIQUE:  Axial imaging performed through the cervical spine without the use of contrast.  Sagittal
 and coronal reconstructed images were also reviewed.  All CT scans at this location are performed us
ing CT dose reduction for ALARA by means of automated exposure control. 



COMPARISON:  None



FINDINGS: 



Alignment:  Spinal alignment is normal.  

Bones:  There is no acute osseous abnormality.  Mild to moderate multilevel discogenic DJD and mild m
ultilevel facet arthropathy, with large anterior osteophytes.

Soft tissues:  No acute or significant incidental soft tissue abnormality.



IMPRESSION:  No acute abnormality.



Signer Name: Wei Peters MD 

Signed: 12/25/2020 12:42 AM

Workstation Name: [id number]

## 2020-12-25 NOTE — XRAY REPORT
CHEST 1 VIEW 



INDICATION: 

chest pain tachycardias.



COMPARISON: 

8/17/2020



FINDINGS:



SUPPORT DEVICES: None.



HEART: Within normal limits. 



LUNGS/PLEURA: Chronic left apical scarring/pleural thickening and streaky airspace opacity. Otherwise
 clear lungs. 



ADDITIONAL FINDINGS: None.



IMPRESSION:

1. No acute findings.



Signer Name: Wei Peters MD 

Signed: 12/25/2020 12:38 AM

Workstation Name: [id number]

## 2020-12-25 NOTE — CAT SCAN REPORT
CT head without contrast



HISTORY: todd etoh dependence, closed head injury.



TECHNIQUE:  Axial imaging performed from the skull apex through the skull base without the use of con
trast.  All CT scans at this location are performed using CT dose reduction for ALARA by means of aut
omated exposure control. 



COMPARISON:  CT head from 8/28/2019



FINDINGS:  



Parenchyma:  No acute intracranial hemorrhage or parenchymal abnormality. Periventricular hypodensiti
es are likely in keeping with microangiopathy.

Ventricles:  There is mild diffuse brain atrophy with commensurate ventricular enlargement which is l
ikely age appropriate.  

Soft tissues:  Soft tissues including the orbits appear normal.   

Bones:  No acute osseous abnormality.   

Sinuses:  Sinuses and mastoid air cells are clear.





IMPRESSION: No acute abnormality.



Signer Name: Wei Peters MD 

Signed: 12/25/2020 12:40 AM

Workstation Name: [id number]

## 2021-10-23 ENCOUNTER — HOSPITAL ENCOUNTER (INPATIENT)
Dept: HOSPITAL 5 - ED | Age: 61
LOS: 2 days | Discharge: HOME | DRG: 378 | End: 2021-10-25
Attending: STUDENT IN AN ORGANIZED HEALTH CARE EDUCATION/TRAINING PROGRAM | Admitting: HOSPITALIST
Payer: SELF-PAY

## 2021-10-23 DIAGNOSIS — F10.239: ICD-10-CM

## 2021-10-23 DIAGNOSIS — I10: ICD-10-CM

## 2021-10-23 DIAGNOSIS — K20.90: ICD-10-CM

## 2021-10-23 DIAGNOSIS — Z20.822: ICD-10-CM

## 2021-10-23 DIAGNOSIS — E87.1: ICD-10-CM

## 2021-10-23 DIAGNOSIS — K29.70: ICD-10-CM

## 2021-10-23 DIAGNOSIS — E11.9: ICD-10-CM

## 2021-10-23 DIAGNOSIS — K92.0: Primary | ICD-10-CM

## 2021-10-23 DIAGNOSIS — K21.9: ICD-10-CM

## 2021-10-23 LAB
ALBUMIN SERPL-MCNC: 4.3 G/DL (ref 3.9–5)
ALT SERPL-CCNC: 24 UNITS/L (ref 7–56)
BASOPHILS # (AUTO): 0 K/MM3 (ref 0–0.1)
BASOPHILS NFR BLD AUTO: 0.3 % (ref 0–1.8)
BILIRUB DIRECT SERPL-MCNC: 0.4 MG/DL (ref 0–0.2)
BUN SERPL-MCNC: 11 MG/DL (ref 9–20)
BUN/CREAT SERPL: 12 %
CALCIUM SERPL-MCNC: 8.9 MG/DL (ref 8.4–10.2)
EOSINOPHIL # BLD AUTO: 0 K/MM3 (ref 0–0.4)
EOSINOPHIL NFR BLD AUTO: 0.2 % (ref 0–4.3)
HCT VFR BLD CALC: 43.6 % (ref 35.5–45.6)
HEMOLYSIS INDEX: 16
HGB BLD-MCNC: 14.2 GM/DL (ref 11.8–15.2)
LYMPHOCYTES # BLD AUTO: 1.7 K/MM3 (ref 1.2–5.4)
LYMPHOCYTES NFR BLD AUTO: 13.7 % (ref 13.4–35)
MCHC RBC AUTO-ENTMCNC: 33 % (ref 32–34)
MCV RBC AUTO: 86 FL (ref 84–94)
MONOCYTES # (AUTO): 1.2 K/MM3 (ref 0–0.8)
MONOCYTES % (AUTO): 9.7 % (ref 0–7.3)
PLATELET # BLD: 236 K/MM3 (ref 140–440)
RBC # BLD AUTO: 5.09 M/MM3 (ref 3.65–5.03)

## 2021-10-23 PROCEDURE — C9113 INJ PANTOPRAZOLE SODIUM, VIA: HCPCS

## 2021-10-23 PROCEDURE — 36415 COLL VENOUS BLD VENIPUNCTURE: CPT

## 2021-10-23 PROCEDURE — 71045 X-RAY EXAM CHEST 1 VIEW: CPT

## 2021-10-23 PROCEDURE — 82962 GLUCOSE BLOOD TEST: CPT

## 2021-10-23 PROCEDURE — 85025 COMPLETE CBC W/AUTO DIFF WBC: CPT

## 2021-10-23 PROCEDURE — 84484 ASSAY OF TROPONIN QUANT: CPT

## 2021-10-23 PROCEDURE — 80048 BASIC METABOLIC PNL TOTAL CA: CPT

## 2021-10-23 PROCEDURE — 83690 ASSAY OF LIPASE: CPT

## 2021-10-23 PROCEDURE — 93005 ELECTROCARDIOGRAM TRACING: CPT

## 2021-10-23 PROCEDURE — 80076 HEPATIC FUNCTION PANEL: CPT

## 2021-10-23 NOTE — EMERGENCY DEPARTMENT REPORT
ED Chest Pain HPI





- General


Chief Complaint: Abdominal Pain


Stated Complaint: ETOH WITHDRAWAL


PUI?: No


Time Seen by Provider: 10/23/21 20:34


Source: EMS


Mode of arrival: Stretcher


Limitations: No Limitations





- History of Present Illness


Initial Comments: 





Chief complaint chest pain abdominal pain vomiting blood hiccups





HPI: This is a 61-year-old male with history of diabetes mellitus, GERD, 

hypertension alcohol dependence who presents with nausea vomiting since 

yesterday.  He felt generally unwell.  Family member called 911.  Last drink of 

alcohol yesterday.  He drinks several beers per day.  EMS called for medical 

direction prior to arrival.  Concern for alcohol withdrawals with tachycardia 

and tremor.  Patient has sharp chest pain rating to the epigastric region.  He 

also has hiccups.  Denies bloody stools.  He did not drink beer today due to 

nausea vomiting.


MD Complaint: chest pain, other (Hiccups abdominal pain coffee-ground emesis)


-: Gradual, This morning


Onset: during rest


Pain Location: substernal


Pain Radiation: none


Severity: moderate


Severity scale (0 -10): 6


Quality: aching, sharp


Consistency: constant


Improves With: nothing


Worsens With: nothing


re: nausea, vomting





- Related Data


                                Home Medications











 Medication  Instructions  Recorded  Confirmed  Last Taken


 


Gabapentin 300 mg PO QDAY 05/15/20 05/16/20 Unknown


 


metFORMIN [Glucophage] 500 mg PO BID 05/15/20 05/16/20 Unknown








                                  Previous Rx's











 Medication  Instructions  Recorded  Last Taken  Type


 


Metoclopramide [Reglan TAB] 10 mg PO QID PRN #30 tab 12/21/19 Unknown Rx


 


Multivitamin with Folic Acid [Cvs 400 mcg PO QDAY #30 tablet 12/21/19 Unknown Rx





One Daily Essential Tablet]    


 


Esomeprazole Magnesium [Nexium 20 mg PO DAILY #30 tab 04/18/20 Unknown Rx





24Hr]    


 


AtorvaSTATin [Lipitor] 40 mg PO QHS #30 tablet 04/20/20 Unknown Rx


 


Metoprolol [Lopressor TAB] 50 mg PO BID #60 tablet 04/20/20 Unknown Rx


 


Multivitamin Tab [Multiple Vitamin 1 each PO QDAY  tablet 04/20/20 Unknown Rx





TAB (Theragran)]    


 


Ibuprofen [Motrin] 600 mg PO Q8H PRN #20 tablet 07/10/20 Unknown Rx


 


Ondansetron [Zofran Odt] 4 mg PO Q8HR PRN #15 tab.rapdis 07/10/20 Unknown Rx


 


Acetaminophen [Non-Aspirin Extra 500 mg PO Q6HR PRN #30 tablet 08/07/20 Unknown 

Rx





Strength]    


 


Famotidine [Pepcid] 20 mg PO BID #60 tablet 08/07/20 Unknown Rx


 


Ibuprofen [Motrin] 600 mg PO Q8H PRN #30 tablet 08/07/20 Unknown Rx


 


Multivitamin with Folic Acid [Cvs 400 mcg PO QDAY #30 tablet 08/07/20 Unknown Rx





One Daily Essential Tablet]    


 


Multivitamin with Folic Acid [Cvs 400 mcg PO QDAY #30 tablet 12/25/20 Unknown Rx





One Daily Essential Tablet]    











                                    Allergies











Allergy/AdvReac Type Severity Reaction Status Date / Time


 


No Known Allergies Allergy   Verified 12/20/19 15:20














Heart Score





- HEART Score


History: Slightly suspicious


EKG: Non-specific


Age: 45-65


Risk factors: 1-2 risk factors


Troponin: < normal limit


HEART Score: 3





- EKG Read Time


Time EKG Completed: 20:58


EKG Read Time: 21:15





- Critical Actions


Critical Actions: 0-3 pts:0.9-1.7%risk of adverse cardiac event.Candidate for 

discharge





ED Review of Systems


ROS: 


Stated complaint: ETOH WITHDRAWAL


Other details as noted in HPI





Comment: All other systems reviewed and negative


Constitutional: malaise.  denies: fever


Respiratory: denies: cough, shortness of breath


Cardiovascular: chest pain


Gastrointestinal: abdominal pain, nausea, hematemesis





ED Past Medical Hx





- Past Medical History


Previous Medical History?: Yes


Hx Hypertension: Yes


Hx Diabetes: Yes


Hx GERD: Yes


Additional medical history: ETOH





- Surgical History


Past Surgical History?: Yes


Additional Surgical History: right arm surgery- to repair artery.  Left knee 

hard ware.





- Social History


Smoking Status: Never Smoker


Substance Use Type: Alcohol





- Medications


Home Medications: 


                                Home Medications











 Medication  Instructions  Recorded  Confirmed  Last Taken  Type


 


Metoclopramide [Reglan TAB] 10 mg PO QID PRN #30 tab 12/21/19 05/16/20 Unknown 

Rx


 


Multivitamin with Folic Acid [Cvs 400 mcg PO QDAY #30 tablet 12/21/19 05/16/20 

Unknown Rx





One Daily Essential Tablet]     


 


Esomeprazole Magnesium [Nexium 20 mg PO DAILY #30 tab 04/18/20 05/16/20 Unknown 

Rx





24Hr]     


 


AtorvaSTATin [Lipitor] 40 mg PO QHS #30 tablet 04/20/20 05/16/20 Unknown Rx


 


Metoprolol [Lopressor TAB] 50 mg PO BID #60 tablet 04/20/20 05/16/20 Unknown Rx


 


Multivitamin Tab [Multiple Vitamin 1 each PO QDAY  tablet 04/20/20 05/16/20 

Unknown Rx





TAB (Theragran)]     


 


Gabapentin 300 mg PO QDAY 05/15/20 05/16/20 Unknown History


 


metFORMIN [Glucophage] 500 mg PO BID 05/15/20 05/16/20 Unknown History


 


Ibuprofen [Motrin] 600 mg PO Q8H PRN #20 tablet 07/10/20  Unknown Rx


 


Ondansetron [Zofran Odt] 4 mg PO Q8HR PRN #15 tab.rapdis 07/10/20  Unknown Rx


 


Acetaminophen [Non-Aspirin Extra 500 mg PO Q6HR PRN #30 tablet 08/07/20  Unknown

 Rx





Strength]     


 


Famotidine [Pepcid] 20 mg PO BID #60 tablet 08/07/20  Unknown Rx


 


Ibuprofen [Motrin] 600 mg PO Q8H PRN #30 tablet 08/07/20  Unknown Rx


 


Multivitamin with Folic Acid [Cvs 400 mcg PO QDAY #30 tablet 08/07/20  Unknown 

Rx





One Daily Essential Tablet]     


 


Multivitamin with Folic Acid [Cvs 400 mcg PO QDAY #30 tablet 12/25/20  Unknown 

Rx





One Daily Essential Tablet]     














ED Physical Exam





- General


Limitations: No Limitations


General appearance: alert, in no apparent distress, anxious, other (Tremulous 

constant hiccups)





- Head


Head exam: Present: atraumatic, normocephalic





- Eye


Eye exam: Present: normal appearance





- ENT


ENT exam: Present: mucous membranes moist





- Neck


Neck exam: Present: normal inspection, full ROM





- Respiratory


Respiratory exam: Present: normal lung sounds bilaterally.  Absent: respiratory 

distress, wheezes, rales, rhonchi





- Cardiovascular


Cardiovascular Exam: Present: normal rhythm, tachycardia, normal heart sounds.  

Absent: systolic murmur, diastolic murmur, rubs, gallop





- GI/Abdominal


GI/Abdominal exam: Present: soft, normal bowel sounds.  Absent: distended, 

tenderness, guarding, rebound





- Rectal


Rectal exam: Present: deferred





- Extremities Exam


Extremities exam: Present: normal inspection





- Neurological Exam


Neurological exam: Present: alert, oriented X3





- Psychiatric


Psychiatric exam: Present: normal affect, anxious





- Skin


Skin exam: Present: warm, dry, intact, normal color.  Absent: rash





ED Course


                                   Vital Signs











  10/23/21 10/23/21 10/23/21





  20:45 20:46 21:10


 


Temperature  98.0 F 98.0 F


 


Pulse Rate 114 H 114 H 114 H


 


Respiratory 22 20 18





Rate   


 


Blood Pressure   


 


Blood Pressure  172/144 172/144





[Left]   


 


O2 Sat by Pulse 98 99 96





Oximetry   














  10/23/21 10/23/21 10/23/21





  21:15 21:31 21:45


 


Temperature   


 


Pulse Rate 114 H 116 H 116 H


 


Respiratory 17 18 18





Rate   


 


Blood Pressure 172/144 161/92 105/74


 


Blood Pressure   





[Left]   


 


O2 Sat by Pulse 96 99 97





Oximetry   














  10/23/21 10/23/21 10/23/21





  22:01 22:15 23:01


 


Temperature   


 


Pulse Rate 134 H 115 H 109 H


 


Respiratory 33 H 20 19





Rate   


 


Blood Pressure 111/71 119/75 100/66


 


Blood Pressure   





[Left]   


 


O2 Sat by Pulse 100 99 100





Oximetry   














  10/23/21 10/23/21





  23:15 23:31


 


Temperature  


 


Pulse Rate 106 H 107 H


 


Respiratory 18 19





Rate  


 


Blood Pressure 101/66 108/66


 


Blood Pressure  





[Left]  


 


O2 Sat by Pulse 99 100





Oximetry  














KELVIN score





- Kelvin Score


Age > 65: (1) Yes


Aspirin use within the Past 7 Days: (1) Yes


3 or more CAD Risk Factors: (0) No


2 or more Angina events in past 24 hrs: (0) No


Known CAD with more than 50% Stenosis: (0) No


Elevated Cardiac Markers: (0) No


ST Deviation Greater than 0.5mm: (0) No


KELVIN Score: 2





ED Medical Decision Making





- Lab Data


Result diagrams: 


                                 10/23/21 20:45





                                 10/23/21 20:45





- EKG Data


-: EKG Interpreted by Me


EKG shows normal: sinus rhythm


Rate: tachycardia





- EKG Data


Interpretation: nonspecific ST-T wave ayala





10/23/21 23:21


EKG obtained 2058


EKG interpreted by me


Rate 110 bpm sinus tachycardia rightward axis normal intervals no ST elevation





- Radiology Data


Radiology results: report reviewed





Patient Name: JANAK NESS


Patient ID: [id number]


Gender: Male


YOB: 1960


Home Phone: [phone]


Referring Provider: ANDREIA WERNER


Organization: [facility]


Accession Number: [id number]


Requested Date: October 23, 2021 20:34


Report Status: Final


Requested Procedure: 1


Procedure Description: XR chest 1V ap


Modality: XR


Findings


Reporting MD: Sole Oliveros


Dictation Time: October 23, 2021 20:01


: Not available


Transcription Date:


CHEST 1 VIEW, 10/23/2021 8:29 PM 


CLINICAL INFORMATION/INDICATION: Chest pain 


COMPARISON: Chest radiograph, 12/25/2020 


FINDINGS: 


SUPPORT DEVICES: None. 


HEART: The cardiac silhouette is normal in size. 


LUNGS/PLEURA: Stable pleural-parenchymal scarring is noted in the left 

hemithorax. Chronic interstitial changes are again noted


with no evidence of superimposed airspace disease or pleural effusion. 


ADDITIONAL FINDINGS: No additional acute findings. 


IMPRESSION: 


1. No evidence of acute cardiopulmonary process. 


Signer Name: Sole Oliveros MD 


Signed: 10/23/2021 8:01 PM 


Workstation Name: [id number]





- Medical Decision Making





1.  Alcohol withdrawal [facility] protocol





2.  Chest pain radiated epigastric region suspect esophagitis alcohol gastritis 

with hematemesis do not suspect cardiac disease patient has had normal cardiac 

catheterization here at this hospital according to previous cardiology 

consultation and electronic medical record.  In April 2020 left heart cardiac 

catheterization revealed normal size left ventricle with normal contractility 

and normal coronary anatomy patient given IV Protonix





3 hiccups due to esophagitis gastritis.  No evidence of myocardial infarction or

 pneumonia .  Patient given IM Thorazine





4.  Hyponatremia due to beer potomania will defer treatment to hospitalist 





Chest radiograph unremarkable, chemistry reveals beer portal damian with 

hyponatremia CBC revealed normal hemoglobin hematocrit with nonspecific 

leukocytosis.  No evidence of infection.  Troponin negative.





Patient is admitted to the hospital service.


Critical care attestation.: 


If time is entered above; I have spent that time in minutes in the direct care 

of this critically ill patient, excluding procedure time.








ED Disposition


Clinical Impression: 


 Alcohol withdrawal, Esophagitis, Gastritis, Hyponatremia





Disposition: 09 ADMITTED AS INPATIENT


Is pt being admited?: Yes


Does the pt Need Aspirin: No


Condition: Stable

## 2021-10-23 NOTE — XRAY REPORT
CHEST 1 VIEW, 10/23/2021 8:29 PM 



CLINICAL INFORMATION/INDICATION: Chest pain



COMPARISON: Chest radiograph, 12/25/2020



FINDINGS:



SUPPORT DEVICES: None.



HEART: The cardiac silhouette is normal in size.



LUNGS/PLEURA: Stable pleural-parenchymal scarring is noted in the left hemithorax. Chronic interstiti
al changes are again noted with no evidence of superimposed airspace disease or pleural effusion.



ADDITIONAL FINDINGS: No additional acute findings.



IMPRESSION:

1. No evidence of acute cardiopulmonary process.



Signer Name: Sole Oliveros MD 

Signed: 10/23/2021 9:01 PM

Workstation Name: [id number]

## 2021-10-24 RX ADMIN — IPRATROPIUM BROMIDE AND ALBUTEROL SULFATE SCH: .5; 3 SOLUTION RESPIRATORY (INHALATION) at 04:14

## 2021-10-24 RX ADMIN — IPRATROPIUM BROMIDE AND ALBUTEROL SULFATE SCH: .5; 3 SOLUTION RESPIRATORY (INHALATION) at 08:58

## 2021-10-24 RX ADMIN — Medication SCH ML: at 10:25

## 2021-10-24 RX ADMIN — INSULIN LISPRO SCH: 100 INJECTION, SOLUTION INTRAVENOUS; SUBCUTANEOUS at 06:41

## 2021-10-24 RX ADMIN — MULTIVITAMIN TABLET SCH EACH: TABLET at 11:00

## 2021-10-24 RX ADMIN — INSULIN LISPRO SCH: 100 INJECTION, SOLUTION INTRAVENOUS; SUBCUTANEOUS at 18:16

## 2021-10-24 RX ADMIN — PANTOPRAZOLE SODIUM SCH MG: 40 INJECTION, POWDER, FOR SOLUTION INTRAVENOUS at 10:49

## 2021-10-24 RX ADMIN — INSULIN LISPRO SCH: 100 INJECTION, SOLUTION INTRAVENOUS; SUBCUTANEOUS at 12:00

## 2021-10-24 RX ADMIN — METOPROLOL TARTRATE SCH: 50 TABLET, FILM COATED ORAL at 22:32

## 2021-10-24 RX ADMIN — PANTOPRAZOLE SODIUM SCH MG: 40 INJECTION, POWDER, FOR SOLUTION INTRAVENOUS at 22:32

## 2021-10-24 RX ADMIN — Medication SCH ML: at 22:32

## 2021-10-24 RX ADMIN — METOPROLOL TARTRATE SCH MG: 50 TABLET, FILM COATED ORAL at 11:00

## 2021-10-24 RX ADMIN — SODIUM CHLORIDE SCH MLS/HR: 0.9 INJECTION, SOLUTION INTRAVENOUS at 03:18

## 2021-10-24 RX ADMIN — SODIUM CHLORIDE SCH MLS/HR: 0.9 INJECTION, SOLUTION INTRAVENOUS at 16:33

## 2021-10-24 NOTE — HISTORY AND PHYSICAL REPORT
History of Present Illness


Date of examination: 10/24/21


Date of admission: 


10/24/21


Chief complaint: 





Abdominal pain, hematemesis, chest pain, hic cups


History of present illness: 





61-year-old male with history of diabetes mellitus, GERD, hypertension alcohol 

dependence was brought to the emergency room because of nausea vomiting blood 

and abdominal pain  since yesterday.  Patient is not feeling well.  Family 

member called 911.  Last drink of alcohol yesterday.  He drinks several beers 

per day.  EMS called for medical direction prior to arrival.  Concern for alc

ohol withdrawals with tachycardia and tremor.  Patient has sharp chest pain 

radiating to the epigastric region.  He also has hiccups.  Denies bloody stools.

 He did not drink beer today due to nausea vomiting.





In the emergency room patient is found to have alcohol withdrawal, vomiting 

blood and abdominal pain so we are going to admit the patient we will put the 

patient on Protonix and consult GI





Med rec is done




















Past History


Past Medical History: diabetes, GERD, hypertension, other (Alcohol withdrawal)





Medications and Allergies


                                    Allergies











Allergy/AdvReac Type Severity Reaction Status Date / Time


 


No Known Allergies Allergy   Verified 12/20/19 15:20











                                Home Medications











 Medication  Instructions  Recorded  Confirmed  Last Taken  Type


 


Metoclopramide [Reglan TAB] 10 mg PO QID PRN #30 tab 12/21/19 05/16/20 Unknown 

Rx


 


Multivitamin with Folic Acid [Cvs 400 mcg PO QDAY #30 tablet 12/21/19 05/16/20 

Unknown Rx





One Daily Essential Tablet]     


 


Esomeprazole Magnesium [Nexium 20 mg PO DAILY #30 tab 04/18/20 05/16/20 Unknown 

Rx





24Hr]     


 


AtorvaSTATin [Lipitor] 40 mg PO QHS #30 tablet 04/20/20 05/16/20 Unknown Rx


 


Metoprolol [Lopressor TAB] 50 mg PO BID #60 tablet 04/20/20 05/16/20 Unknown Rx


 


Multivitamin Tab [Multiple Vitamin 1 each PO QDAY  tablet 04/20/20 05/16/20 

Unknown Rx





TAB (Theragran)]     


 


Gabapentin 300 mg PO QDAY 05/15/20 05/16/20 Unknown History


 


metFORMIN [Glucophage] 500 mg PO BID 05/15/20 05/16/20 Unknown History


 


Ibuprofen [Motrin] 600 mg PO Q8H PRN #20 tablet 07/10/20  Unknown Rx


 


Ondansetron [Zofran Odt] 4 mg PO Q8HR PRN #15 tab.rapdis 07/10/20  Unknown Rx


 


Acetaminophen [Non-Aspirin Extra 500 mg PO Q6HR PRN #30 tablet 08/07/20  Unknown

 Rx





Strength]     


 


Famotidine [Pepcid] 20 mg PO BID #60 tablet 08/07/20  Unknown Rx


 


Ibuprofen [Motrin] 600 mg PO Q8H PRN #30 tablet 08/07/20  Unknown Rx


 


Multivitamin with Folic Acid [Cvs 400 mcg PO QDAY #30 tablet 08/07/20  Unknown 

Rx





One Daily Essential Tablet]     


 


Multivitamin with Folic Acid [Cvs 400 mcg PO QDAY #30 tablet 12/25/20  Unknown 

Rx





One Daily Essential Tablet]     











Active Meds: 


Active Medications





Acetaminophen (Acetaminophen 325 Mg Tab)  650 mg PO Q4H PRN


   PRN Reason: Pain MILD(1-3)/Fever >100.5/HA


Albuterol (Albuterol 2.5 Mg/3 Ml Nebu)  2.5 mg IH Q4HRT PRN


   PRN Reason: Shortness Of Breath


Albuterol/Ipratropium (Ipratropium/Albuterol Sulfate 3 Ml Ampul.Neb)  1 ampul IH

Q6HRT SHAWANDA


Atorvastatin Calcium (Atorvastatin 40 Mg Tab)  40 mg PO QHS SHAWANDA


Dextrose (Dextrose 50% In Water (25gm) 50 Ml Syringe)  50 ml IV Q30MIN PRN; 

Protocol


   PRN Reason: Hypoglycemia


Hydromorphone HCl (Hydromorphone 1 Mg/1 Ml Inj)  0.5 mg IV Q3H PRN


   PRN Reason: Pain , Severe (7-10)


Sodium Chloride (Nacl 0.9% 1000 Ml)  1,000 mls @ 100 mls/hr IV AS DIRECT SHAWANDA


Insulin Human Lispro (Insulin Lispro 100 Unit/Ml)  0 unit SUB-Q Q6HR SHAWANDA; 

Protocol


Lorazepam (Lorazepam 2 Mg/Ml Vial)  4 mg IV Q1H PRN


   PRN Reason: WOO-Ar 16-25


Lorazepam (Lorazepam 2 Mg/Ml Vial)  2 mg IV Q1H PRN


   PRN Reason: WOO-Ar 8-15


   Last Admin: 10/23/21 21:03 Dose:  2 mg


   Documented by: 


Metoclopramide HCl (Metoclopramide 10 Mg Tab)  10 mg PO QID PRN


   PRN Reason: Nausea


Metoprolol Tartrate (Metoprolol Tartrate 50 Mg Tab)  50 mg PO BID [facility]


Multivitamins (Multivitamins ,Therapeutic Tab)  1 each PO QDAY [facility]


Multivitamins (Multivitamins ,Therapeutic Tab)   each PO QDAY [facility]


Ondansetron HCl (Ondansetron 4 Mg/2 Ml Inj)  4 mg IV Q8H PRN


   PRN Reason: Nausea And Vomiting


Oxycodone/Acetaminophen (Oxycodone /Acetaminophen 5-325mg Tab)  1 tab PO Q6H PRN


   PRN Reason: Pain, Moderate (4-6)


Pantoprazole Sodium (Pantoprazole 40 Mg Inj)  40 mg IV BID SHAWANDA


Sodium Chloride (Sodium Chloride 0.9% 10 Ml Flush Syringe)  10 ml IV BID SHAWANDA


Sodium Chloride (Sodium Chloride 0.9% 10 Ml Flush Syringe)  10 ml IV PRN PRN


   PRN Reason: LINE FLUSH











Review of Systems


All systems: negative


Cardiovascular: chest pain


Gastrointestinal: abdominal pain, nausea, vomiting, hematemesis





Exam





- Constitutional


Vitals: 


                                        











Temp Pulse Resp BP Pulse Ox


 


 98.0 F   107 H  19   108/66   100 


 


 10/23/21 21:10  10/23/21 23:31  10/23/21 23:31  10/23/21 23:31  10/23/21 23:31











General appearance: Present: no acute distress, well-nourished





- EENT


Eyes: Present: PERRL


ENT: hearing intact, clear oral mucosa





- Neck


Neck: Present: supple, normal ROM





- Respiratory


Respiratory effort: normal


Respiratory: bilateral: diminished





- Cardiovascular


Heart Sounds: Present: S1 & S2.  Absent: rub, click





- Extremities


Extremities: pulses symmetrical, No edema


Peripheral Pulses: within normal limits





- Abdominal


General gastrointestinal: Present: soft, tender, non-distended, normal bowel 

sounds


Male genitourinary: Present: normal





- Integumentary


Integumentary: Present: clear, warm, dry





- Musculoskeletal


Musculoskeletal: gait normal, strength equal bilaterally





- Psychiatric


Psychiatric: appropriate mood/affect, intact judgment & insight





- Neurologic


Neurologic: CNII-XII intact, moves all extremities





HEART Score





- HEART Score


EKG: Non-specific


Age: 45-65


Risk factors: 1-2 risk factors


Troponin: 


                                        











Troponin T  < 0.010 ng/mL (0.00-0.029)   10/23/21  20:45    











Troponin: < normal limit





- Critical Actions


Critical Actions: 0-3 pts:0.9-1.7%risk of adverse cardiac event.Candidate for 

discharge





Results





- Labs


CBC & Chem 7: 


                                 10/23/21 20:45





                                 10/23/21 20:45


Labs: 


                             Laboratory Last Values











WBC  12.6 K/mm3 (4.5-11.0)  H  10/23/21  20:45    


 


RBC  5.09 M/mm3 (3.65-5.03)  H  10/23/21  20:45    


 


Hgb  14.2 gm/dl (11.8-15.2)   10/23/21  20:45    


 


Hct  43.6 % (35.5-45.6)   10/23/21  20:45    


 


MCV  86 fl (84-94)   10/23/21  20:45    


 


MCH  28 pg (28-32)   10/23/21  20:45    


 


MCHC  33 % (32-34)   10/23/21  20:45    


 


RDW  14.8 % (13.2-15.2)   10/23/21  20:45    


 


Plt Count  236 K/mm3 (140-440)   10/23/21  20:45    


 


Lymph % (Auto)  13.7 % (13.4-35.0)   10/23/21  20:45    


 


Mono % (Auto)  9.7 % (0.0-7.3)  H  10/23/21  20:45    


 


Eos % (Auto)  0.2 % (0.0-4.3)   10/23/21  20:45    


 


Baso % (Auto)  0.3 % (0.0-1.8)   10/23/21  20:45    


 


Lymph # (Auto)  1.7 K/mm3 (1.2-5.4)   10/23/21  20:45    


 


Mono # (Auto)  1.2 K/mm3 (0.0-0.8)  H  10/23/21  20:45    


 


Eos # (Auto)  0.0 K/mm3 (0.0-0.4)   10/23/21  20:45    


 


Baso # (Auto)  0.0 K/mm3 (0.0-0.1)   10/23/21  20:45    


 


Seg Neutrophils %  76.1 % (40.0-70.0)  H  10/23/21  20:45    


 


Seg Neutrophils #  9.6 K/mm3 (1.8-7.7)  H  10/23/21  20:45    


 


Sodium  127 mmol/L (137-145)  L  10/23/21  20:45    


 


Potassium  3.7 mmol/L (3.6-5.0)   10/23/21  20:45    


 


Chloride  88.3 mmol/L ()  L  10/23/21  20:45    


 


Carbon Dioxide  21 mmol/L (22-30)  L  10/23/21  20:45    


 


Anion Gap  21 mmol/L  10/23/21  20:45    


 


BUN  11 mg/dL (9-20)   10/23/21  20:45    


 


Creatinine  0.9 mg/dL (0.8-1.3)   10/23/21  20:45    


 


Estimated GFR  > 60 ml/min  10/23/21  20:45    


 


BUN/Creatinine Ratio  12 %  10/23/21  20:45    


 


Glucose  138 mg/dL ()  H  10/23/21  20:45    


 


Calcium  8.9 mg/dL (8.4-10.2)   10/23/21  20:45    


 


Total Bilirubin  1.30 mg/dL (0.1-1.2)  H  10/23/21  20:45    


 


Direct Bilirubin  0.4 mg/dL (0-0.2)  H  10/23/21  20:45    


 


Indirect Bilirubin  0.9 mg/dL  10/23/21  20:45    


 


AST  26 units/L (5-40)   10/23/21  20:45    


 


ALT  24 units/L (7-56)   10/23/21  20:45    


 


Alkaline Phosphatase  96 units/L ()   10/23/21  20:45    


 


Troponin T  < 0.010 ng/mL (0.00-0.029)   10/23/21  20:45    


 


Total Protein  7.5 g/dL (6.3-8.2)   10/23/21  20:45    


 


Albumin  4.3 g/dL (3.9-5)   10/23/21  20:45    


 


Albumin/Globulin Ratio  1.3 %  10/23/21  20:45    


 


Lipase  12 units/L (13-60)  L  10/23/21  20:45    














- Imaging and Cardiology


Chest x-ray: report reviewed





Assessment and Plan


VTE prophylaxis?: Mechanical


Plan of care discussed with patient/family: Yes





- Patient Problems


(1) Hematemesis


Current Visit: Yes   Status: Acute   


Plan to address problem: 


Admit the patient to the medical floor.  N.p.o.  Normal saline at the rate of 

100 cc/h.  Protonix 40 mg IV every 12 hours.  We will do the serial CBC.  Will 

consult GI for evaluation.  Recheck CBC in the morning








(2) Alcohol withdrawal


Current Visit: Yes   Status: Acute   


Plan to address problem: 


We will put the patient on CIWA protocol as per alcohol withdrawal protocol.  

Protonix 40 mg IV every 12 hours.  Multivitamin 1 tablet p.o. daily.  We will 

monitor the patient closely.  Patient counseled regarding quit drinking








(3) Gastritis


Current Visit: Yes   Status: Acute   


Plan to address problem: 


NPO.  Normal saline at the rate of 100 cc/h.  Protonix 40 mg IV every 12 hours. 

Reconsult GI








(4) Hyponatremia


Current Visit: Yes   Status: Acute   


Plan to address problem: 


Normal saline at the rate of 100 cc/h.  We will recheck BMP in the morning








(5) Chest pain


Current Visit: No   Status: Acute   


Qualifiers: 


   Chest pain type: unspecified   Qualified Code(s): R07.9 - Chest pain, 

unspecified   


Plan to address problem: 


Most likely secondary to gastritis.  NPO.  Normal saline at the rate 100 cc/h.  

We continue the home medication.  We will monitor the patient closely.  We do 

the serial cardiac enzyme.In April 2020 left heart cardiac catheterization 

revealed normal size left ventricle with normal contractility and normal 

coronary anatomy .  If needed will consult cardiology








(6) Type 2 diabetes mellitus


Current Visit: No   Status: Chronic   


Qualifiers: 


   Diabetes mellitus long term insulin use: unspecified long term insulin use 

status 


Plan to address problem: 


Humalog sliding scale Accu-Chek every 6 hours with moderate dose coverage.  

Reconsult diabetic education.  Recheck BMP in the morning








(7) DVT prophylaxis


Current Visit: No   Status: Acute   


Plan to address problem: 


SCD for DVT prophylaxis.  Protonix 40 mg IV every 12 hours for GI prophylaxis.  

Patient is a full code

## 2021-10-24 NOTE — CONSULTATION
History of Present Illness





- Reason for Consult


Consult date: 10/24/21


Coffee ground emesis, N/V


Requesting physician: ANDREIA HARRIS





- History of Present Illness


Mr. Cabral is a 61-year-old  who was in his usual state of health until 

yesterday when he had acute onset of nausea and vomiting as well as epigastric 

and chest pain he notes that he vomited multiple times with fluid and bubbles in

it and eventually some coffee-ground type material mixed in with the fluid.  He 

is feeling much better now.  He also complained of chest pain with numbness in 

his left hand.  He has had similar symptoms in the past and underwent an 

extensive cardiac evaluation that was negative in May 2020.  Patient states that

he came to the hospital because he wanted to get better. 


Patient has GERD symptoms and is supposed to be on a proton pump inhibitor but 

had not had the funds to get the medications.  He is drinking 2 beers or more a 

day, and smokes approximately half a pack per day.


Bowel movements occur once or twice a day.  He denies melena or hematochezia.  

There is no weight loss.  He has no known history of liver disease.


Meds reviewed.








Past History


Past Medical History: diabetes, GERD, hypertension, other (Alcohol withdrawal)


Past Surgical History: No surgical history


Social history: smoking, alcohol abuse





Medications and Allergies


                                    Allergies











Allergy/AdvReac Type Severity Reaction Status Date / Time


 


No Known Allergies Allergy   Verified 12/20/19 15:20











                                Home Medications











 Medication  Instructions  Recorded  Confirmed  Last Taken  Type


 


Metoclopramide [Reglan TAB] 10 mg PO QID PRN #30 tab 12/21/19 05/16/20 Unknown 

Rx


 


Multivitamin with Folic Acid [[facility] 400 mcg PO QDAY #30 tablet 12/21/19 05/16/20 

Unknown Rx





One Daily Essential Tablet]     


 


Esomeprazole Magnesium [Nexium 20 mg PO DAILY #30 tab 04/18/20 05/16/20 Unknown 

Rx





24Hr]     


 


AtorvaSTATin [Lipitor] 40 mg PO QHS #30 tablet 04/20/20 05/16/20 Unknown Rx


 


Metoprolol [Lopressor TAB] 50 mg PO BID #60 tablet 04/20/20 05/16/20 Unknown Rx


 


Multivitamin Tab [Multiple Vitamin 1 each PO QDAY  tablet 04/20/20 05/16/20 

Unknown Rx





TAB (Theragran)]     


 


Gabapentin 300 mg PO QDAY 05/15/20 05/16/20 Unknown History


 


metFORMIN [Glucophage] 500 mg PO BID 05/15/20 05/16/20 Unknown History


 


Ibuprofen [Motrin] 600 mg PO Q8H PRN #20 tablet 07/10/20  Unknown Rx


 


Ondansetron [Zofran Odt] 4 mg PO Q8HR PRN #15 tab.rapdis 07/10/20  Unknown Rx


 


Acetaminophen [Non-Aspirin Extra 500 mg PO Q6HR PRN #30 tablet 08/07/20  Unknown

 Rx





Strength]     


 


Famotidine [Pepcid] 20 mg PO BID #60 tablet 08/07/20  Unknown Rx


 


Ibuprofen [Motrin] 600 mg PO Q8H PRN #30 tablet 08/07/20  Unknown Rx


 


Multivitamin with Folic Acid [[facility]vs 400 mcg PO QDAY #30 tablet 08/07/20  Unknown 

Rx





One Daily Essential Tablet]     


 


Multivitamin with Folic Acid [[facility]vs 400 mcg PO QDAY #30 tablet 12/25/20  Unknown 

Rx





One Daily Essential Tablet]     











Active Meds: 


Active Medications





Acetaminophen (Acetaminophen 325 Mg Tab)  650 mg PO Q4H PRN


   PRN Reason: Pain MILD(1-3)/Fever >100.5/HA


Albuterol (Albuterol 2.5 Mg/3 Ml Nebu)  2.5 mg IH Q4HRT PRN


   PRN Reason: Shortness Of Breath


Atorvastatin Calcium (Atorvastatin 40 Mg Tab)  40 mg PO QHS SHAWANDA


Dextrose (Dextrose 50% In Water (25gm) 50 Ml Syringe)  50 ml IV Q30MIN PRN; 

Protocol


   PRN Reason: Hypoglycemia


Sodium Chloride (Nacl 0.9% 1000 Ml)  1,000 mls @ 100 mls/hr IV AS DIRECT SHAWANDA


   Last Admin: 10/24/21 16:33 Dose:  100 mls/hr


   Documented by: 


Insulin Human Lispro (Insulin Lispro 100 Unit/Ml)  0 unit SUB-Q Q6HR SHAWANDA; 

Protocol


   Last Admin: 10/24/21 12:00 Dose:  Not Given


   Documented by: 


Lorazepam (Lorazepam 2 Mg/Ml Vial)  4 mg IV Q1H PRN


   PRN Reason: WOO-Ar 16-25


Lorazepam (Lorazepam 2 Mg/Ml Vial)  2 mg IV Q1H PRN


   PRN Reason: LAURAWA-Ar 8-15


   Last Admin: 10/23/21 21:03 Dose:  2 mg


   Documented by: 


Metoclopramide HCl (Metoclopramide 10 Mg Tab)  10 mg PO QID PRN


   PRN Reason: Nausea


Metoprolol Tartrate (Metoprolol Tartrate 50 Mg Tab)  50 mg PO BID [facility]


   Last Admin: 10/24/21 11:00 Dose:  50 mg


   Documented by: 


Multivitamins (Multivitamins ,Therapeutic Tab)  1 each PO QDAY [facility]


   Last Admin: 10/24/21 11:00 Dose:  1 each


   Documented by: 


Ondansetron HCl (Ondansetron 4 Mg/2 Ml Inj)  4 mg IV Q8H PRN


   PRN Reason: Nausea And Vomiting


Oxycodone/Acetaminophen (Oxycodone /Acetaminophen 5-325mg Tab)  1 tab PO Q6H PRN


   PRN Reason: Pain, Moderate (4-6)


Pantoprazole Sodium (Pantoprazole 40 Mg Inj)  40 mg IV BID [facility]


   Last Admin: 10/24/21 10:49 Dose:  40 mg


   Documented by: 


Sodium Chloride (Sodium Chloride 0.9% 10 Ml Flush Syringe)  10 ml IV BID [facility]


   Last Admin: 10/24/21 10:25 Dose:  10 ml


   Documented by: 


Sodium Chloride (Sodium Chloride 0.9% 10 Ml Flush Syringe)  10 ml IV PRN PRN


   PRN Reason: LINE FLUSH


   Last Admin: 10/24/21 16:22 Dose:  10 ml


   Documented by: 











Review of Systems


All systems: negative (as noted above.)





Exam





- Constitutional


Vitals: 


                                        











Temp Pulse Resp BP Pulse Ox


 


 98.3 F   88   18   128/81   100 


 


 10/24/21 12:54  10/24/21 12:54  10/24/21 12:54  10/24/21 12:54  10/24/21 12:54











General appearance: Present: no acute distress





- EENT


Eyes: Present: PERRL, EOM intact


ENT: hearing intact





- Respiratory


Respiratory effort: normal


Respiratory: bilateral: CTA





- Cardiovascular


Rhythm: regular


Heart Sounds: Present: S1 & S2





- Extremities


Extremities: No edema





- Abdominal


General gastrointestinal: Present: soft, non-tender





Results





- Labs


CBC & Chem 7: 


                                 10/23/21 20:45





                                 10/23/21 20:45


Labs: 


                              Abnormal lab results











  10/23/21 10/23/21 10/24/21 Range/Units





  20:45 20:45 06:08 


 


WBC  12.6 H    (4.5-11.0)  K/mm3


 


RBC  5.09 H    (3.65-5.03)  M/mm3


 


Mono % (Auto)  9.7 H    (0.0-7.3)  %


 


Mono # (Auto)  1.2 H    (0.0-0.8)  K/mm3


 


Seg Neutrophils %  76.1 H    (40.0-70.0)  %


 


Seg Neutrophils #  9.6 H    (1.8-7.7)  K/mm3


 


Sodium   127 L   (137-145)  mmol/L


 


Chloride   88.3 L   ()  mmol/L


 


Carbon Dioxide   21 L   (22-30)  mmol/L


 


Glucose   138 H   ()  mg/dL


 


POC Glucose    127 H  ()  mg/dL


 


Total Bilirubin   1.30 H   (0.1-1.2)  mg/dL


 


Direct Bilirubin   0.4 H   (0-0.2)  mg/dL


 


Lipase   12 L   (13-60)  units/L














  10/24/21 Range/Units





  11:50 


 


WBC   (4.5-11.0)  K/mm3


 


RBC   (3.65-5.03)  M/mm3


 


Mono % (Auto)   (0.0-7.3)  %


 


Mono # (Auto)   (0.0-0.8)  K/mm3


 


Seg Neutrophils %   (40.0-70.0)  %


 


Seg Neutrophils #   (1.8-7.7)  K/mm3


 


Sodium   (137-145)  mmol/L


 


Chloride   ()  mmol/L


 


Carbon Dioxide   (22-30)  mmol/L


 


Glucose   ()  mg/dL


 


POC Glucose  125 H  ()  mg/dL


 


Total Bilirubin   (0.1-1.2)  mg/dL


 


Direct Bilirubin   (0-0.2)  mg/dL


 


Lipase   (13-60)  units/L














Assessment and Plan


1.  Coffee ground emesis - minimal amount mixed with fluid.  Most likely related

to esophagitis, possibly Chio-Massey tear.  Symptoms resolved, with no further

N/V.  Hgb normal.


- adv diet as tolerated


- chronic PPI





2.  N/V - acute onset.  Resolved.  May have been transient toxic/infectious 

process.  No further evaluation for now.





3.  Epigastric pain - may have been musculoskeletal and due to retching.  

Improving.  No further evaluation.





4.  Alcohol and tobacco abuse -patient expresses desire to quit.  Encouraged.





No further GI recommendations.  We will sign off.

## 2021-10-24 NOTE — PROGRESS NOTE
Assessment and Plan


Assessment and plan: 





History of present illness: 





61-year-old male with history of diabetes mellitus, GERD, hypertension alcohol 

dependence was brought to the emergency room because of nausea vomiting blood 

and abdominal pain  since yesterday.  Patient is not feeling well.  Family 

member called 911.  Last drink of alcohol yesterday.  He drinks several beers 

per day.  EMS called for medical direction prior to arrival.  Concern for 

alcohol withdrawals with tachycardia and tremor.  Patient has sharp chest pain 

radiating to the epigastric region.  He also has hiccups.  Denies bloody stools.

 He did not drink beer today due to nausea vomiting. In the emergency room 

patient is found to have alcohol withdrawal, vomiting blood and abdominal pain 

so we are going to admit the patient we will put the patient on Protonix and 

consult GI





Hospital course to date


10/24: Resting comfortably on encounter.  Hemodynamically stable.  Hemoglobin of

14.2 on CBC last night.  Getting IV normal saline currently due to hyponatremia 

likely from volume depletion.  We will continue supportive management, patient 

will remain n.p.o., continue with Protonix, and continue IV normal saline.  

Awaiting gastroenterology recommendations.








Assessment and plan


(1) Hematemesis


Current Visit: Yes   Status: Acute   


Plan to address problem: 


Admit the patient to the medical floor.  


N.p.o.  


Normal saline at the rate of 100 cc/h.  


Protonix 40 mg IV every 12 hours.  


We will do the serial CBC.  


Will consult GI for evaluation.  


Recheck CBC in the morning








(2) Alcohol withdrawal


Current Visit: Yes   Status: Acute   


Plan to address problem: 


We will put the patient on CIWA protocol as per alcohol withdrawal protocol.  


Protonix 40 mg IV every 12 hours.  


Multivitamin 1 tablet p.o. daily.  


We will monitor the patient closely.  


Patient counseled regarding quit drinking








(3) Gastritis


Current Visit: Yes   Status: Acute   


Plan to address problem: 


NPO.  Normal saline at the rate of 100 cc/h.  Protonix 40 mg IV every 12 hours. 

Consult GI








(4) Hyponatremia


Current Visit: Yes   Status: Acute   


Plan to address problem: 


Normal saline at the rate of 100 cc/h.  We will recheck BMP in the morning








(5) Chest pain


Current Visit: No   Status: Acute   


Qualifiers: 


   Chest pain type: unspecified   Qualified Code(s): R07.9 - Chest pain, 

unspecified   


Plan to address problem: 


Most likely secondary to gastritis.  NPO.  Normal saline at the rate 100 cc/h.  

We continue the home medication.  We will monitor the patient closely.  We do 

the serial cardiac enzyme.In April 2020 left heart cardiac catheterization 

revealed normal size left ventricle with normal contractility and normal 

coronary anatomy .  If needed will consult cardiology








(6) Type 2 diabetes mellitus


Current Visit: No   Status: Chronic   


Qualifiers: 


   Diabetes mellitus long term insulin use: unspecified long term insulin use 

status 


Plan to address problem: 


Humalog sliding scale Accu-Chek every 6 hours with moderate dose coverage.  

Reconsult diabetic education.  Recheck BMP in the morning








(7) DVT prophylaxis


Current Visit: No   Status: Acute   


Plan to address problem: 


SCD for DVT prophylaxis.  Protonix 40 mg IV every 12 hours for GI prophylaxis.  

Patient is a full code








History


Interval history: 





Asymptomatic on encounter no acute complaints.  He states that hematemesis 

started on Friday and patient started having stomach pain yesterday which is why

he came to our facility.  Patient states that he has had hematemesis in the past

before and was told at that time that this was related to his history of 

alcoholism.  He denied any tremors, headache, hallucinations, melena or 

hematochezia.





Hospitalist Physical





- Physical exam


Narrative exam: 





General appearance: Present: no acute distress, well-nourished





- EENT


Eyes: Present: PERRL


ENT: hearing intact, clear oral mucosa





- Neck


Neck: Present: supple, normal ROM





- Respiratory


Respiratory effort: normal


Respiratory: bilateral: diminished





- Cardiovascular


Heart Sounds: Present: S1 & S2.  Absent: rub, click





- Extremities


Extremities: pulses symmetrical, No edema


Peripheral Pulses: within normal limits





- Abdominal


General gastrointestinal: Present: soft, tender, non-distended, normal bowel 

sounds


Male genitourinary: Present: normal





- Integumentary


Integumentary: Present: clear, warm, dry





- Musculoskeletal


Musculoskeletal: gait normal, strength equal bilaterally





- Psychiatric


Psychiatric: appropriate mood/affect, intact judgment & insight





- Neurologic


Neurologic: CNII-XII intact, moves all extremities








- Constitutional


Vitals: 


                                        











Temp Pulse Resp BP Pulse Ox


 


 98.0 F   101 H  30 H  109/82   96 


 


 10/23/21 21:10  10/24/21 04:21  10/24/21 04:21  10/24/21 04:21  10/24/21 04:21











General appearance: Present: no acute distress, well-nourished





HEART Score





- HEART Score


EKG: Non-specific


Age: 45-65


Risk factors: 1-2 risk factors


Troponin: 


                                        











Troponin T  < 0.010 ng/mL (0.00-0.029)   10/23/21  20:45    











Troponin: < normal limit





- Critical Actions


Critical Actions: 0-3 pts:0.9-1.7%risk of adverse cardiac event.Candidate for 

discharge





Results





- Labs


CBC & Chem 7: 


                                 10/23/21 20:45





                                 10/23/21 20:45


Labs: 


                             Laboratory Last Values











WBC  12.6 K/mm3 (4.5-11.0)  H  10/23/21  20:45    


 


RBC  5.09 M/mm3 (3.65-5.03)  H  10/23/21  20:45    


 


Hgb  14.2 gm/dl (11.8-15.2)   10/23/21  20:45    


 


Hct  43.6 % (35.5-45.6)   10/23/21  20:45    


 


MCV  86 fl (84-94)   10/23/21  20:45    


 


MCH  28 pg (28-32)   10/23/21  20:45    


 


MCHC  33 % (32-34)   10/23/21  20:45    


 


RDW  14.8 % (13.2-15.2)   10/23/21  20:45    


 


Plt Count  236 K/mm3 (140-440)   10/23/21  20:45    


 


Lymph % (Auto)  13.7 % (13.4-35.0)   10/23/21  20:45    


 


Mono % (Auto)  9.7 % (0.0-7.3)  H  10/23/21  20:45    


 


Eos % (Auto)  0.2 % (0.0-4.3)   10/23/21  20:45    


 


Baso % (Auto)  0.3 % (0.0-1.8)   10/23/21  20:45    


 


Lymph # (Auto)  1.7 K/mm3 (1.2-5.4)   10/23/21  20:45    


 


Mono # (Auto)  1.2 K/mm3 (0.0-0.8)  H  10/23/21  20:45    


 


Eos # (Auto)  0.0 K/mm3 (0.0-0.4)   10/23/21  20:45    


 


Baso # (Auto)  0.0 K/mm3 (0.0-0.1)   10/23/21  20:45    


 


Seg Neutrophils %  76.1 % (40.0-70.0)  H  10/23/21  20:45    


 


Seg Neutrophils #  9.6 K/mm3 (1.8-7.7)  H  10/23/21  20:45    


 


Sodium  127 mmol/L (137-145)  L  10/23/21  20:45    


 


Potassium  3.7 mmol/L (3.6-5.0)   10/23/21  20:45    


 


Chloride  88.3 mmol/L ()  L  10/23/21  20:45    


 


Carbon Dioxide  21 mmol/L (22-30)  L  10/23/21  20:45    


 


Anion Gap  21 mmol/L  10/23/21  20:45    


 


BUN  11 mg/dL (9-20)   10/23/21  20:45    


 


Creatinine  0.9 mg/dL (0.8-1.3)   10/23/21  20:45    


 


Estimated GFR  > 60 ml/min  10/23/21  20:45    


 


BUN/Creatinine Ratio  12 %  10/23/21  20:45    


 


Glucose  138 mg/dL ()  H  10/23/21  20:45    


 


POC Glucose  127 mg/dL ()  H  10/24/21  06:08    


 


Calcium  8.9 mg/dL (8.4-10.2)   10/23/21  20:45    


 


Total Bilirubin  1.30 mg/dL (0.1-1.2)  H  10/23/21  20:45    


 


Direct Bilirubin  0.4 mg/dL (0-0.2)  H  10/23/21  20:45    


 


Indirect Bilirubin  0.9 mg/dL  10/23/21  20:45    


 


AST  26 units/L (5-40)   10/23/21  20:45    


 


ALT  24 units/L (7-56)   10/23/21  20:45    


 


Alkaline Phosphatase  96 units/L ()   10/23/21  20:45    


 


Troponin T  < 0.010 ng/mL (0.00-0.029)   10/23/21  20:45    


 


Total Protein  7.5 g/dL (6.3-8.2)   10/23/21  20:45    


 


Albumin  4.3 g/dL (3.9-5)   10/23/21  20:45    


 


Albumin/Globulin Ratio  1.3 %  10/23/21  20:45    


 


Lipase  12 units/L (13-60)  L  10/23/21  20:45    














Active Medications





- Current Medications


Current Medications: 














Generic Name Dose Route Start Last Admin





  Trade Name Freq  PRN Reason Stop Dose Admin


 


Acetaminophen  650 mg  10/24/21 01:16 





  Acetaminophen 325 Mg Tab  PO  





  Q4H PRN  





  Pain MILD(1-3)/Fever >100.5/HA  


 


Albuterol  2.5 mg  10/24/21 01:16 





  Albuterol 2.5 Mg/3 Ml Nebu  IH  





  Q4HRT PRN  





  Shortness Of Breath  


 


Albuterol/Ipratropium  1 ampul  10/24/21 02:00  10/24/21 04:14





  Ipratropium/Albuterol Sulfate 3 Ml Ampul.Neb  IH   Not Given





  Q6HRT SHAWANDA  


 


Atorvastatin Calcium  40 mg  10/24/21 22:00 





  Atorvastatin 40 Mg Tab  PO  





  QHS SHAWANDA  


 


Dextrose  50 ml  10/24/21 01:24 





  Dextrose 50% In Water (25gm) 50 Ml Syringe  IV  





  Q30MIN PRN  





  Hypoglycemia  





  Protocol  


 


Hydromorphone HCl  0.5 mg  10/24/21 01:16 





  Hydromorphone 1 Mg/1 Ml Inj  IV  





  Q3H PRN  





  Pain , Severe (7-10)  


 


Sodium Chloride  1,000 mls @ 100 mls/hr  10/24/21 01:30  10/24/21 03:18





  Nacl 0.9% 1000 Ml  IV   100 mls/hr





  AS DIRECT SHAWANDA   Administration


 


Insulin Human Lispro  0 unit  10/24/21 06:00  10/24/21 06:41





  Insulin Lispro 100 Unit/Ml  SUB-Q   Not Given





  Q6HR [facility]  





  Protocol  


 


Lorazepam  4 mg  10/23/21 20:35 





  Lorazepam 2 Mg/Ml Vial  IV  





  Q1H PRN  





  CIWA-Ar 16-25  


 


Lorazepam  2 mg  10/23/21 20:35  10/23/21 21:03





  Lorazepam 2 Mg/Ml Vial  IV   2 mg





  Q1H PRN   Administration





  CIWA-Ar 8-15  


 


Metoclopramide HCl  10 mg  10/24/21 01:20 





  Metoclopramide 10 Mg Tab  PO  





  QID PRN  





  Nausea  


 


Metoprolol Tartrate  50 mg  10/24/21 10:00 





  Metoprolol Tartrate 50 Mg Tab  PO  





  BID [facility]  


 


Multivitamins  1 each  10/24/21 10:00 





  Multivitamins ,Therapeutic Tab  PO  





  QDAY SHAWANDA  


 


Ondansetron HCl  4 mg  10/24/21 01:16 





  Ondansetron 4 Mg/2 Ml Inj  IV  





  Q8H PRN  





  Nausea And Vomiting  


 


Oxycodone/Acetaminophen  1 tab  10/24/21 01:16 





  Oxycodone /Acetaminophen 5-325mg Tab  PO  





  Q6H PRN  





  Pain, Moderate (4-6)  


 


Pantoprazole Sodium  40 mg  10/24/21 10:00 





  Pantoprazole 40 Mg Inj  IV  





  BID [facility]  


 


Sodium Chloride  10 ml  10/24/21 10:00 





  Sodium Chloride 0.9% 10 Ml Flush Syringe  IV  





  BID SHAWANDA  


 


Sodium Chloride  10 ml  10/24/21 01:16 





  Sodium Chloride 0.9% 10 Ml Flush Syringe  IV  





  PRN PRN  





  LINE FLUSH

## 2021-10-25 VITALS — SYSTOLIC BLOOD PRESSURE: 146 MMHG | DIASTOLIC BLOOD PRESSURE: 83 MMHG

## 2021-10-25 LAB
BASOPHILS # (AUTO): 0 K/MM3 (ref 0–0.1)
BASOPHILS NFR BLD AUTO: 0.5 % (ref 0–1.8)
BUN SERPL-MCNC: 9 MG/DL (ref 9–20)
BUN/CREAT SERPL: 11 %
CALCIUM SERPL-MCNC: 8.6 MG/DL (ref 8.4–10.2)
EOSINOPHIL # BLD AUTO: 0.1 K/MM3 (ref 0–0.4)
EOSINOPHIL NFR BLD AUTO: 0.8 % (ref 0–4.3)
HCT VFR BLD CALC: 39.7 % (ref 35.5–45.6)
HEMOLYSIS INDEX: 7
HGB BLD-MCNC: 12.7 GM/DL (ref 11.8–15.2)
LYMPHOCYTES # BLD AUTO: 1.9 K/MM3 (ref 1.2–5.4)
LYMPHOCYTES NFR BLD AUTO: 21.5 % (ref 13.4–35)
MCHC RBC AUTO-ENTMCNC: 32 % (ref 32–34)
MCV RBC AUTO: 88 FL (ref 84–94)
MONOCYTES # (AUTO): 0.8 K/MM3 (ref 0–0.8)
MONOCYTES % (AUTO): 9.2 % (ref 0–7.3)
PLATELET # BLD: 175 K/MM3 (ref 140–440)
RBC # BLD AUTO: 4.51 M/MM3 (ref 3.65–5.03)

## 2021-10-25 RX ADMIN — SODIUM CHLORIDE SCH MLS/HR: 0.9 INJECTION, SOLUTION INTRAVENOUS at 03:00

## 2021-10-25 RX ADMIN — INSULIN LISPRO SCH: 100 INJECTION, SOLUTION INTRAVENOUS; SUBCUTANEOUS at 00:16

## 2021-10-25 RX ADMIN — PANTOPRAZOLE SODIUM SCH MG: 40 INJECTION, POWDER, FOR SOLUTION INTRAVENOUS at 10:10

## 2021-10-25 RX ADMIN — Medication SCH ML: at 10:10

## 2021-10-25 RX ADMIN — METOPROLOL TARTRATE SCH MG: 50 TABLET, FILM COATED ORAL at 10:10

## 2021-10-25 RX ADMIN — MULTIVITAMIN TABLET SCH EACH: TABLET at 10:11

## 2021-10-25 RX ADMIN — INSULIN LISPRO SCH: 100 INJECTION, SOLUTION INTRAVENOUS; SUBCUTANEOUS at 07:40

## 2021-10-25 NOTE — DISCHARGE SUMMARY
Providers





- Providers


Date of Admission: 


10/24/21 01:52





Date of discharge: 10/25/21


Attending physician: 


BREONNA WOODY MD





                                        





10/24/21 01:16


Consult to Physician [CONS] Routine 


   Comment: 


   Consulting Provider: DALTON MCNEAL


   Physician Instructions: 


   Reason For Exam: gib





10/24/21 01:24


Consult to Dietitian/Nutrition [CONS] Routine 


   Physician Instructions: 


   Reason For Exam: 


   Reason for Consult: Diet education











Primary care physician: 


PRIMARY CARE MD








Hospitalization


Reason for admission: hematemesis


Condition: Fair


Hospital course: 





History of present illness: 





61-year-old male with history of diabetes mellitus, GERD, hypertension alcohol 

dependence was brought to the emergency room because of nausea vomiting blood 

and abdominal pain  since yesterday.  Patient is not feeling well.  Family 

member called 911.  Last drink of alcohol yesterday.  He drinks several beers 

per day.  EMS called for medical direction prior to arrival.  Concern for 

alcohol withdrawals with tachycardia and tremor.  Patient has sharp chest pain 

radiating to the epigastric region.  He also has hiccups.  Denies bloody stools.

  He did not drink beer today due to nausea vomiting. In the emergency room 

patient is found to have alcohol withdrawal, vomiting blood and abdominal pain 

so we are going to admit the patient we will put the patient on Protonix and 

consult GI





Hospital course to date


10/24: Asymptomatic on encounter no acute complaints.  He states that 

hematemesis started on Friday and patient started having stomach pain yesterday 

which is why he came to our facility.  Patient states that he has had 

hematemesis in the past before and was told at that time that this was related 

to his history of alcoholism.  He denied any tremors, headache, hallucinations, 

melena or hematochezia. Resting comfortably on encounter.  Hemodynamically 

stable.  Hemoglobin of 14.2 on CBC last night.  Getting IV normal saline 

currently due to hyponatremia likely from volume depletion.  We will continue 

supportive management, patient will remain n.p.o., continue with Protonix, and 

continue IV normal saline.  Awaiting gastroenterology recommendations.








10/25: Hemoglobin is stable this morning 12.7.  Gastroenterology evaluated 

patient yesterday.  Recommended chronic PPI and to advance diet as tolerated.  

Hematemesis likely related to esophagitis, possibly Chio-Massey tear.  

Symptoms have now resolved with out further nausea and vomiting.  Epigastric 

pain is improving likely due to retching from vomiting.  Patient will be advised

 to quit smoking and drinking alcohol.  Patient has expressed a desire to quit 

and was advised to seek counseling outpatient.  At the time of discharge patient

 had no signs and symptoms of alcoholic withdrawal.  All vital signs were 

stable.  He is advised to follow-up outpatient with his primary care doctor in 3

 to 5 days.  Information was provided for gastroenterology as well.  

Prescription for Protonix sent to [facility] pharmacy in [place], [place].





Assessment and plan


(1) Hematemesis


Current Visit: Yes   Status: Acute   


Plan to address problem: 


Admit the patient to the medical floor.  


N.p.o.  


Normal saline at the rate of 100 cc/h.  


Protonix 40 mg IV every 12 hours.  


We will do the serial CBC.  


Will consult GI for evaluation.  


Recheck CBC in the morning








(2) Alcohol withdrawal


Current Visit: Yes   Status: Acute   


Plan to address problem: 


We will put the patient on CIWA protocol as per alcohol withdrawal protocol.  


Protonix 40 mg IV every 12 hours.  


Multivitamin 1 tablet p.o. daily.  


We will monitor the patient closely.  


Patient counseled regarding quit drinking








(3) Gastritis


Current Visit: Yes   Status: Acute   


Plan to address problem: 


NPO.  Normal saline at the rate of 100 cc/h.  Protonix 40 mg IV every 12 hours. 

 Consult GI








(4) Hyponatremia


Current Visit: Yes   Status: Acute   


Plan to address problem: 


Normal saline at the rate of 100 cc/h.  We will recheck BMP in the morning








(5) Chest pain


Current Visit: No   Status: Acute   


Qualifiers: 


   Chest pain type: unspecified   Qualified Code(s): R07.9 - Chest pain, 

unspecified   


Plan to address problem: 


Most likely secondary to gastritis.  NPO.  Normal saline at the rate 100 cc/h.  

We continue the home medication.  We will monitor the patient closely.  We do 

the serial cardiac enzyme.In April 2020 left heart cardiac catheterization r

evealed normal size left ventricle with normal contractility and normal coronary

 anatomy .  If needed will consult cardiology








(6) Type 2 diabetes mellitus


Current Visit: No   Status: Chronic   


Qualifiers: 


   Diabetes mellitus long term insulin use: unspecified long term insulin use 

status 


Plan to address problem: 


Humalog sliding scale Accu-Chek every 6 hours with moderate dose coverage.  

Reconsult diabetic education.  Recheck BMP in the morning








(7) DVT prophylaxis


Current Visit: No   Status: Acute   


Plan to address problem: 


SCD for DVT prophylaxis.  Protonix 40 mg IV every 12 hours for GI prophylaxis.  

Patient is a full code




















Disposition: 01 HOME / SELF CARE / HOMELESS


Final Discharge Diagnosis (Prints w/discharge instructions): Hematemesis


Time spent for discharge: 35





- Discharge Diagnoses


(1) Alcohol withdrawal


Status: Acute   





(2) Esophagitis


Status: Acute   





(3) Gastritis


Status: Acute   





(4) Hematemesis


Status: Acute   





(5) Tobacco abuse


Status: Chronic   





Core Measure Documentation





- Palliative Care


Palliative Care/ Comfort Measures: Not Applicable





- Core Measures


Any of the following diagnoses?: none





Exam





- Physical Exam


Narrative exam: 





General appearance: Present: no acute distress, well-nourished





- EENT


Eyes: Present: PERRL


ENT: hearing intact, clear oral mucosa





- Neck


Neck: Present: supple, normal ROM





- Respiratory


Respiratory effort: normal


Respiratory: bilateral: diminished





- Cardiovascular


Heart Sounds: Present: S1 & S2.  Absent: rub, click





- Extremities


Extremities: pulses symmetrical, No edema


Peripheral Pulses: within normal limits





- Abdominal


General gastrointestinal: Present: soft, tender, non-distended, normal bowel 

sounds


Male genitourinary: Present: normal





- Integumentary


Integumentary: Present: clear, warm, dry





- Musculoskeletal


Musculoskeletal: gait normal, strength equal bilaterally





- Psychiatric


Psychiatric: appropriate mood/affect, intact judgment & insight





- Neurologic


Neurologic: CNII-XII intact, moves all extremities








- Constitutional


Vitals: 


                                        











Temp Pulse Resp BP Pulse Ox


 


 97.8 F   90   18   146/83   100 


 


 10/25/21 07:54  10/25/21 07:54  10/25/21 07:54  10/25/21 07:54  10/25/21 07:54














Plan


Activity: advance as tolerated


Weight Bearing Status: Weight Bear as Tolerated


Diet: low fat, low cholesterol, low salt


Follow up with: 


PRIMARY CARE,MD [Primary Care Provider] - 7 Days


DALTON MCNEAL MD [Staff Physician] - 7 Days


Prescriptions: 


Pantoprazole [Protonix] 40 mg PO QDAY 30 Days #30 tablet

## 2021-10-25 NOTE — ELECTROCARDIOGRAPH REPORT
[facility]

                                       

Test Date:    2021-10-23               Test Time:    20:58:09

Pat Name:     JANAK NESS          Department:   

Patient ID:   [id number]          Room:         A464 1

Gender:       M                        Technician:   JUAN DAVID

:          1960               Requested By: ANDREIA WERNER

Order Number: [id number]              Reading MD:   Marbin Dotson

                                 Measurements

Intervals                              Axis          

Rate:         109                      P:            84

WI:           157                      QRS:          98

QRSD:         79                       T:            9

QT:           342                                    

QTc:          460                                    

                           Interpretive Statements

Sinus tachycardia

Ventricular premature complex

Right atrial enlargement

Right axis deviation

Borderline ST depression, anterolateral leads

No previous ECG available for comparison

Electronically Signed On 10- 9:41:36 EDT by Marbin Dotson